# Patient Record
Sex: FEMALE | NOT HISPANIC OR LATINO | URBAN - METROPOLITAN AREA
[De-identification: names, ages, dates, MRNs, and addresses within clinical notes are randomized per-mention and may not be internally consistent; named-entity substitution may affect disease eponyms.]

---

## 2024-01-16 ENCOUNTER — HOSPITAL ENCOUNTER (EMERGENCY)
Facility: HOSPITAL | Age: 39
Discharge: HOME | End: 2024-01-16
Attending: EMERGENCY MEDICINE
Payer: MEDICAID

## 2024-01-16 VITALS
DIASTOLIC BLOOD PRESSURE: 95 MMHG | TEMPERATURE: 97.7 F | HEART RATE: 102 BPM | HEIGHT: 65 IN | RESPIRATION RATE: 20 BRPM | OXYGEN SATURATION: 100 % | SYSTOLIC BLOOD PRESSURE: 140 MMHG | WEIGHT: 162 LBS | BODY MASS INDEX: 26.99 KG/M2

## 2024-01-16 PROCEDURE — 99281 EMR DPT VST MAYX REQ PHY/QHP: CPT | Performed by: EMERGENCY MEDICINE

## 2024-01-16 PROCEDURE — 4500999001 HC ED NO CHARGE

## 2024-01-21 ENCOUNTER — HOSPITAL ENCOUNTER (EMERGENCY)
Facility: HOSPITAL | Age: 39
Discharge: HOME | End: 2024-01-21
Attending: EMERGENCY MEDICINE
Payer: MEDICAID

## 2024-01-21 VITALS
HEIGHT: 65 IN | OXYGEN SATURATION: 98 % | SYSTOLIC BLOOD PRESSURE: 145 MMHG | TEMPERATURE: 98.1 F | BODY MASS INDEX: 27.03 KG/M2 | WEIGHT: 162.26 LBS | DIASTOLIC BLOOD PRESSURE: 98 MMHG | RESPIRATION RATE: 18 BRPM | HEART RATE: 98 BPM

## 2024-01-21 DIAGNOSIS — R21 RASH AND OTHER NONSPECIFIC SKIN ERUPTION: Primary | ICD-10-CM

## 2024-01-21 PROCEDURE — 2500000001 HC RX 250 WO HCPCS SELF ADMINISTERED DRUGS (ALT 637 FOR MEDICARE OP): Performed by: EMERGENCY MEDICINE

## 2024-01-21 PROCEDURE — 99283 EMERGENCY DEPT VISIT LOW MDM: CPT | Performed by: EMERGENCY MEDICINE

## 2024-01-21 PROCEDURE — 2500000004 HC RX 250 GENERAL PHARMACY W/ HCPCS (ALT 636 FOR OP/ED): Performed by: EMERGENCY MEDICINE

## 2024-01-21 RX ORDER — LORATADINE 10 MG/1
10 TABLET ORAL DAILY
Status: DISCONTINUED | OUTPATIENT
Start: 2024-01-21 | End: 2024-01-21 | Stop reason: HOSPADM

## 2024-01-21 RX ORDER — FAMOTIDINE 20 MG/1
20 TABLET, FILM COATED ORAL ONCE
Status: COMPLETED | OUTPATIENT
Start: 2024-01-21 | End: 2024-01-21

## 2024-01-21 RX ORDER — PREDNISONE 10 MG/1
40 TABLET ORAL DAILY
Qty: 16 TABLET | Refills: 0 | Status: SHIPPED | OUTPATIENT
Start: 2024-01-21 | End: 2024-01-25

## 2024-01-21 RX ADMIN — FAMOTIDINE 20 MG: 20 TABLET, FILM COATED ORAL at 15:43

## 2024-01-21 RX ADMIN — LORATADINE 10 MG: 10 TABLET ORAL at 15:43

## 2024-01-21 RX ADMIN — DEXAMETHASONE 10 MG: 6 TABLET ORAL at 15:43

## 2024-01-21 ASSESSMENT — PAIN SCALES - GENERAL: PAINLEVEL_OUTOF10: 10 - WORST POSSIBLE PAIN

## 2024-01-21 ASSESSMENT — COLUMBIA-SUICIDE SEVERITY RATING SCALE - C-SSRS
6. HAVE YOU EVER DONE ANYTHING, STARTED TO DO ANYTHING, OR PREPARED TO DO ANYTHING TO END YOUR LIFE?: NO
2. HAVE YOU ACTUALLY HAD ANY THOUGHTS OF KILLING YOURSELF?: NO
1. IN THE PAST MONTH, HAVE YOU WISHED YOU WERE DEAD OR WISHED YOU COULD GO TO SLEEP AND NOT WAKE UP?: NO

## 2024-01-21 ASSESSMENT — PAIN - FUNCTIONAL ASSESSMENT: PAIN_FUNCTIONAL_ASSESSMENT: 0-10

## 2024-01-21 NOTE — DISCHARGE INSTRUCTIONS
As discussed, call dermatology tomorrow regarding results for you to take the steroids for the next few days and you can use over-the-counter Pepcid and Benadryl as needed

## 2024-01-21 NOTE — ED PROVIDER NOTES
EMERGENCY DEPARTMENT ENCOUNTER      [ ] CODE STEMI [ ] CODE Neuro [ ] CODE Yellow [ ] Modified Trauma [ ] CODE Blue      CHIEF COMPLAINT      Chief Complaint   Patient presents with    Rash     Pt complains of a persistent rash on all over her body x1 year.  Has received treatment multiple times.  Was told it is mrsa.  States it is not getting better and her skin burns.        Mode of Arrival:  Primary Care Provider: No primary care provider on file.  Medical Record Number: [id number]      History obtained by: Patient  Limited by nothing  Time seen: 4:39 PM    QUALITY MEASURES   PPE Utilized: N95 with goggles and gloves      HPI      Latrice Sofia is a 38 y.o. female with a history of hep C, untreated as well as a skin rash that has been occurring for the last year had already seen dermatology about a week ago with biopsy performed and had been given antibiotics in the past including Keflex Bactrim and doxycycline, states that she still having same rash along with itchiness of right shoulder.  When asked when was the last time she took doxycycline she was not sure.  Denies that the rash that has changed primarily that it is still causing itchiness and would like medication for relief.  Does not know the results of the biopsy yet.  Denies any associated fever or chills or mouth sores or inability to eat, trouble breathing chest pain abdominal pain dysuria or any desqumenation of skin.  No new complaints      Patient otherwise denies fever, chills, n/v/d, chest pain, shortness of breath, sore throat, cough, rhinorrhea, abdominal pain, dysuria, hematuria, swollen extremities , hematemesis, hematochezia, melena or any other accompanying symptoms of late.      The patient has no other complaints at this time.               PAST MEDICAL HISTORY    No past medical history on file.      I have personally reviewed the patient's past medical history in the records.  Ernie Slade MD    SURGICAL HISTORY    No past surgical history  "on file.    I have personally reviewed the patient's past surgical history in the records.  Ernie Slade MD    CURRENT MEDICATIONS    I have reviewed the patient’s medications.   Please see nursing and pharmacy records for the most up to date list.     [unfilled]    ALLERGIES    Allergies   Allergen Reactions    Pineapple Shortness of breath       I have personally reviewed the patient's past history of allergies in the records.  Ernie Slade MD    FAMILY HISTORY    No family history on file.    I have personally reviewed the patient's family history in the records.  Ernie Slade MD    SOCIAL HISTORY    Social History     Socioeconomic History    Marital status: Unknown     Spouse name: Not on file    Number of children: Not on file    Years of education: Not on file    Highest education level: Not on file   Occupational History    Not on file   Tobacco Use    Smoking status: Not on file    Smokeless tobacco: Not on file   Substance and Sexual Activity    Alcohol use: Not on file    Drug use: Not on file    Sexual activity: Not on file   Other Topics Concern    Not on file   Social History Narrative    Not on file     Social Determinants of Health     Financial Resource Strain: Not on file   Food Insecurity: Not on file   Transportation Needs: Not on file   Physical Activity: Not on file   Stress: Not on file   Social Connections: Not on file   Intimate Partner Violence: Not on file   Housing Stability: Not on file         I have personally reviewed the patient's social history in the records.  Ernie Slade MD    REVIEW OF SYSTEMS      14 point ROS was reviewed and negative except as noted above in HPI.      PHYSICAL EXAM    VITAL SIGNS:    BP (!) 145/98   Pulse 98   Temp 36.7 °C (98.1 °F)   Resp 18   Ht 1.651 m (5' 5\")   Wt 73.6 kg (162 lb 4.1 oz)   SpO2 98%   BMI 27.00 kg/m²    Review EMR for vital signs  Nursing note and vitals reviewed.    Constitutional:  Alert and oriented, well-developed, well-nourished, " appears stated age, non-toxic appearing  HENT:  Normocephalic, atraumatic, bilateral external ears normal, oropharynx moist, Nose normal.  Neck: normal range of motion, no tenderness, supple, no stridor.  Eyes:  PERRL, EOMI, conjunctiva normal, no discharge.   Cardiovascular:  Normal heart rate, normal rhythm, no murmurs, no rubs, no gallops.   Respiratory:  Normal breath sounds, no respiratory distress, no wheezing, no chest wall tenderness.      Integument:   Slight hives noted in the right shoulder but otherwise noted generalized chronic sores throughout the body including face back trunk arms and legs.  None appear to be infected  Back:  No midline tenderness, no CVA tenderness.   Musculoskeletal:  Intact distal pulses, no tenderness, no cyanosis, no clubbing, with capillary refill less than 2 seconds. Good range of motion in all major joints. No tenderness to palpation or major deformities noted.    Neurologic:  Alert & oriented x 3, normal motor function, normal sensory function, no focal deficits noted. Cranial nerves II-XII intact.       EKG  None          Reviewed and interpreted by me, Ernie Slade MD             RADIOLOGY  No orders to display       All Imaging studies evaluated and interpreted by ED physician except when noted otherwise.    ED PROVIDER INTERPRETATION (XRAYS ONLY):       *I have interpreted the x-ray real-time in the ED myself, and made a clinical decision on it prior to the formal radiology reading.    Ernie Slade M.D.    RADIOLOGIST IMPRESSION (U/S, CT, MRI):   No orders to display         PERTINENT LABS    Please refer to the chart for all lab work and to MDM for relevant discussion.      PROCEDURE    None (procdoc)    ED COURSE & MEDICAL DECISION MAKING    Pertinent Labs & Imaging studies reviewed. (See chart for details)    MDM:    Assessment: Latrice Sofia is a 38 y.o.female who presents to the ED with nonspecific rash that appears to be chronic and told patient I would give her  "prednisone for the next few days but that ultimately she should call her dermatologist to find out the results of the biopsy and further recommendations for treatment for this chronic condition.  Patient understands and agrees with plan.  Will give Decadron Pepcid and Benadryl prior to discharge as significant other drove her here.  Do not suspect Cooper-Helder syndrome or any other acute illness      Prior records in EPIC reviewed by me.     2023 Coding Requirements:  --Independent historian(s):    see HPI  --Review of prior records:    EHR reviewed   --Relevant comorbidities:    see records  --Social determinants of health:            CELLULITIS/ ABSCESS / RASH/ INSECT BITE  I have considered the following   with regards to this patient's condition: Skin rash, allergic reaction, contact   dermatitis, poison ivy, drug rash, erythema multiforme, hives, urticaria, soft   tissue infection, abscess, cellulitis, necrotizing fasciitis, systemic infection, SIRS,   Sepsis, impetigo, MRSA, insect bite, scabies or bedbugs, systemic infection,   chickenpox, herpes zoster, disseminated gonococcemia, Lyme disease,   meningococcemia, pityriasis rosea, Melitno mountain spotted fever, scarlet fever,   syphilis, toxic shock syndrome, viral exanthem.              ED VITALS  Vitals:    01/21/24 1524 01/21/24 1548   BP: (!) 147/101 (!) 145/98   Pulse: (!) 130 98   Resp: 18 18   Temp: 36.7 °C (98.1 °F)    SpO2: 97% 98%   Weight: 73.6 kg (162 lb 4.1 oz)    Height: 1.651 m (5' 5\")        BP  Min: 145/98  Max: 147/101    As part of the 2022 [facility] reporting requirements, the following measures have been reviewed and documented:    None     1. Rash and other nonspecific skin eruption        Diagnoses as of 01/21/24 1639   Rash and other nonspecific skin eruption         DISPOSITION       DISCHARGE.  The patient is discharged back to their place of residence.  Discharge diagnosis, instructions and plan were discussed and understood. At the " time of discharge the patient was comfortable and was in no apparent distress. Patient is aware of diagnostic uncertainty and was notified though testing is negative here, there is a very small chance that pathology may be missed.  The patient understands these risks and the patient /family understood to return immediately to the emergency department if the symptoms worsen or if they have any additional concerns.    DISCHARGE MEDICATIONS  New Prescriptions    No medications on file       FOLLOW UP  No follow-up provider specified.    Ernie Slade    This note was created with the assistance of voice recognition technology.  While attempts were made to ensure accuracy, mis-transcription may be present due to limitations in the software.        Electronically signed by MD Ernie Ward MD  01/21/24 0122

## 2024-01-27 ENCOUNTER — HOSPITAL ENCOUNTER (EMERGENCY)
Facility: HOSPITAL | Age: 39
Discharge: HOME | End: 2024-01-28
Payer: MEDICAID

## 2024-01-27 VITALS
HEIGHT: 65 IN | BODY MASS INDEX: 25.71 KG/M2 | HEART RATE: 106 BPM | RESPIRATION RATE: 15 BRPM | SYSTOLIC BLOOD PRESSURE: 118 MMHG | OXYGEN SATURATION: 100 % | DIASTOLIC BLOOD PRESSURE: 75 MMHG | WEIGHT: 154.32 LBS | TEMPERATURE: 97.9 F

## 2024-01-27 PROCEDURE — 99281 EMR DPT VST MAYX REQ PHY/QHP: CPT

## 2024-01-27 PROCEDURE — 4500999001 HC ED NO CHARGE

## 2024-01-27 ASSESSMENT — PAIN SCALES - GENERAL: PAINLEVEL_OUTOF10: 8

## 2024-01-27 ASSESSMENT — PAIN DESCRIPTION - PAIN TYPE: TYPE: ACUTE PAIN

## 2024-01-27 ASSESSMENT — PAIN - FUNCTIONAL ASSESSMENT: PAIN_FUNCTIONAL_ASSESSMENT: 0-10

## 2024-01-27 ASSESSMENT — COLUMBIA-SUICIDE SEVERITY RATING SCALE - C-SSRS
1. IN THE PAST MONTH, HAVE YOU WISHED YOU WERE DEAD OR WISHED YOU COULD GO TO SLEEP AND NOT WAKE UP?: NO
6. HAVE YOU EVER DONE ANYTHING, STARTED TO DO ANYTHING, OR PREPARED TO DO ANYTHING TO END YOUR LIFE?: NO
2. HAVE YOU ACTUALLY HAD ANY THOUGHTS OF KILLING YOURSELF?: NO

## 2024-01-27 ASSESSMENT — PAIN DESCRIPTION - LOCATION: LOCATION: HEAD

## 2024-01-27 ASSESSMENT — PAIN DESCRIPTION - PROGRESSION: CLINICAL_PROGRESSION: NOT CHANGED

## 2024-05-31 ENCOUNTER — APPOINTMENT (OUTPATIENT)
Dept: CARDIOLOGY | Facility: HOSPITAL | Age: 39
End: 2024-05-31
Payer: MEDICAID

## 2024-05-31 ENCOUNTER — HOSPITAL ENCOUNTER (EMERGENCY)
Facility: HOSPITAL | Age: 39
Discharge: OTHER INSTITUTION | End: 2024-06-01
Attending: STUDENT IN AN ORGANIZED HEALTH CARE EDUCATION/TRAINING PROGRAM
Payer: MEDICAID

## 2024-05-31 DIAGNOSIS — T50.902A INTENTIONAL DRUG OVERDOSE, INITIAL ENCOUNTER (MULTI): ICD-10-CM

## 2024-05-31 DIAGNOSIS — T14.91XA SUICIDE ATTEMPT (MULTI): Primary | ICD-10-CM

## 2024-05-31 LAB
ALBUMIN SERPL-MCNC: 3.9 G/DL (ref 3.5–5)
ALP BLD-CCNC: 73 U/L (ref 35–125)
ALT SERPL-CCNC: 11 U/L (ref 5–40)
AMPHETAMINES UR QL SCN>1000 NG/ML: POSITIVE
ANION GAP SERPL CALC-SCNC: 10 MMOL/L
APAP SERPL-MCNC: <5 UG/ML
APPEARANCE UR: ABNORMAL
AST SERPL-CCNC: 17 U/L (ref 5–40)
BACTERIA #/AREA URNS AUTO: ABNORMAL /HPF
BARBITURATES UR QL SCN>300 NG/ML: NEGATIVE
BASOPHILS # BLD AUTO: 0.06 X10*3/UL (ref 0–0.1)
BASOPHILS NFR BLD AUTO: 1 %
BENZODIAZ UR QL SCN>300 NG/ML: NEGATIVE
BILIRUB SERPL-MCNC: 0.3 MG/DL (ref 0.1–1.2)
BILIRUB UR STRIP.AUTO-MCNC: NEGATIVE MG/DL
BUN SERPL-MCNC: 7 MG/DL (ref 8–25)
BZE UR QL SCN>300 NG/ML: NEGATIVE
CALCIUM SERPL-MCNC: 8.4 MG/DL (ref 8.5–10.4)
CANNABINOIDS UR QL SCN>50 NG/ML: NEGATIVE
CHLORIDE SERPL-SCNC: 102 MMOL/L (ref 97–107)
CK SERPL-CCNC: 101 U/L (ref 24–195)
CO2 SERPL-SCNC: 23 MMOL/L (ref 24–31)
COLOR UR: ABNORMAL
CREAT SERPL-MCNC: 0.7 MG/DL (ref 0.4–1.6)
EGFRCR SERPLBLD CKD-EPI 2021: >90 ML/MIN/1.73M*2
EOSINOPHIL # BLD AUTO: 0.23 X10*3/UL (ref 0–0.7)
EOSINOPHIL NFR BLD AUTO: 3.9 %
ERYTHROCYTE [DISTWIDTH] IN BLOOD BY AUTOMATED COUNT: 11.6 % (ref 11.5–14.5)
ETHANOL SERPL-MCNC: 0.08 G/DL
FENTANYL+NORFENTANYL UR QL SCN: NEGATIVE
GLUCOSE SERPL-MCNC: 171 MG/DL (ref 65–99)
GLUCOSE UR STRIP.AUTO-MCNC: NORMAL MG/DL
HCG UR QL IA.RAPID: NEGATIVE
HCT VFR BLD AUTO: 36.3 % (ref 36–46)
HGB BLD-MCNC: 12 G/DL (ref 12–16)
HYALINE CASTS #/AREA URNS AUTO: ABNORMAL /LPF
IMM GRANULOCYTES # BLD AUTO: 0.02 X10*3/UL (ref 0–0.7)
IMM GRANULOCYTES NFR BLD AUTO: 0.3 % (ref 0–0.9)
KETONES UR STRIP.AUTO-MCNC: NEGATIVE MG/DL
LEUKOCYTE ESTERASE UR QL STRIP.AUTO: ABNORMAL
LYMPHOCYTES # BLD AUTO: 2.02 X10*3/UL (ref 1.2–4.8)
LYMPHOCYTES NFR BLD AUTO: 34.2 %
MAGNESIUM SERPL-MCNC: 1.9 MG/DL (ref 1.6–3.1)
MCH RBC QN AUTO: 30.4 PG (ref 26–34)
MCHC RBC AUTO-ENTMCNC: 33.1 G/DL (ref 32–36)
MCV RBC AUTO: 92 FL (ref 80–100)
METHADONE UR QL SCN>300 NG/ML: NEGATIVE
MONOCYTES # BLD AUTO: 0.42 X10*3/UL (ref 0.1–1)
MONOCYTES NFR BLD AUTO: 7.1 %
MUCOUS THREADS #/AREA URNS AUTO: ABNORMAL /LPF
NEUTROPHILS # BLD AUTO: 3.15 X10*3/UL (ref 1.2–7.7)
NEUTROPHILS NFR BLD AUTO: 53.5 %
NITRITE UR QL STRIP.AUTO: NEGATIVE
NRBC BLD-RTO: 0 /100 WBCS (ref 0–0)
OPIATES UR QL SCN>300 NG/ML: NEGATIVE
OXYCODONE UR QL: NEGATIVE
PCP UR QL SCN>25 NG/ML: NEGATIVE
PH UR STRIP.AUTO: 6 [PH]
PLATELET # BLD AUTO: 216 X10*3/UL (ref 150–450)
POTASSIUM SERPL-SCNC: 3.2 MMOL/L (ref 3.4–5.1)
PROT SERPL-MCNC: 6.6 G/DL (ref 5.9–7.9)
PROT UR STRIP.AUTO-MCNC: NEGATIVE MG/DL
RBC # BLD AUTO: 3.95 X10*6/UL (ref 4–5.2)
RBC # UR STRIP.AUTO: ABNORMAL /UL
RBC #/AREA URNS AUTO: ABNORMAL /HPF
SALICYLATES SERPL-MCNC: 1 MG/DL
SODIUM SERPL-SCNC: 135 MMOL/L (ref 133–145)
SP GR UR STRIP.AUTO: 1.01
SQUAMOUS #/AREA URNS AUTO: ABNORMAL /HPF
UROBILINOGEN UR STRIP.AUTO-MCNC: NORMAL MG/DL
WBC # BLD AUTO: 5.9 X10*3/UL (ref 4.4–11.3)
WBC #/AREA URNS AUTO: ABNORMAL /HPF

## 2024-05-31 PROCEDURE — 80320 DRUG SCREEN QUANTALCOHOLS: CPT | Performed by: STUDENT IN AN ORGANIZED HEALTH CARE EDUCATION/TRAINING PROGRAM

## 2024-05-31 PROCEDURE — 82550 ASSAY OF CK (CPK): CPT | Performed by: STUDENT IN AN ORGANIZED HEALTH CARE EDUCATION/TRAINING PROGRAM

## 2024-05-31 PROCEDURE — 96366 THER/PROPH/DIAG IV INF ADDON: CPT

## 2024-05-31 PROCEDURE — 81025 URINE PREGNANCY TEST: CPT | Performed by: STUDENT IN AN ORGANIZED HEALTH CARE EDUCATION/TRAINING PROGRAM

## 2024-05-31 PROCEDURE — 2500000004 HC RX 250 GENERAL PHARMACY W/ HCPCS (ALT 636 FOR OP/ED): Performed by: STUDENT IN AN ORGANIZED HEALTH CARE EDUCATION/TRAINING PROGRAM

## 2024-05-31 PROCEDURE — 99285 EMERGENCY DEPT VISIT HI MDM: CPT | Mod: 25 | Performed by: STUDENT IN AN ORGANIZED HEALTH CARE EDUCATION/TRAINING PROGRAM

## 2024-05-31 PROCEDURE — 96361 HYDRATE IV INFUSION ADD-ON: CPT

## 2024-05-31 PROCEDURE — 96366 THER/PROPH/DIAG IV INF ADDON: CPT | Performed by: STUDENT IN AN ORGANIZED HEALTH CARE EDUCATION/TRAINING PROGRAM

## 2024-05-31 PROCEDURE — P9612 CATHETERIZE FOR URINE SPEC: HCPCS

## 2024-05-31 PROCEDURE — 96374 THER/PROPH/DIAG INJ IV PUSH: CPT | Mod: 59

## 2024-05-31 PROCEDURE — 96361 HYDRATE IV INFUSION ADD-ON: CPT | Performed by: STUDENT IN AN ORGANIZED HEALTH CARE EDUCATION/TRAINING PROGRAM

## 2024-05-31 PROCEDURE — 96375 TX/PRO/DX INJ NEW DRUG ADDON: CPT | Performed by: STUDENT IN AN ORGANIZED HEALTH CARE EDUCATION/TRAINING PROGRAM

## 2024-05-31 PROCEDURE — 36415 COLL VENOUS BLD VENIPUNCTURE: CPT | Performed by: STUDENT IN AN ORGANIZED HEALTH CARE EDUCATION/TRAINING PROGRAM

## 2024-05-31 PROCEDURE — 87086 URINE CULTURE/COLONY COUNT: CPT | Mod: TRILAB,WESLAB | Performed by: STUDENT IN AN ORGANIZED HEALTH CARE EDUCATION/TRAINING PROGRAM

## 2024-05-31 PROCEDURE — 96375 TX/PRO/DX INJ NEW DRUG ADDON: CPT

## 2024-05-31 PROCEDURE — 85025 COMPLETE CBC W/AUTO DIFF WBC: CPT | Performed by: STUDENT IN AN ORGANIZED HEALTH CARE EDUCATION/TRAINING PROGRAM

## 2024-05-31 PROCEDURE — 93005 ELECTROCARDIOGRAM TRACING: CPT

## 2024-05-31 PROCEDURE — 83735 ASSAY OF MAGNESIUM: CPT | Performed by: STUDENT IN AN ORGANIZED HEALTH CARE EDUCATION/TRAINING PROGRAM

## 2024-05-31 PROCEDURE — 96374 THER/PROPH/DIAG INJ IV PUSH: CPT | Mod: 59 | Performed by: STUDENT IN AN ORGANIZED HEALTH CARE EDUCATION/TRAINING PROGRAM

## 2024-05-31 PROCEDURE — 82435 ASSAY OF BLOOD CHLORIDE: CPT | Performed by: STUDENT IN AN ORGANIZED HEALTH CARE EDUCATION/TRAINING PROGRAM

## 2024-05-31 PROCEDURE — 80307 DRUG TEST PRSMV CHEM ANLYZR: CPT | Performed by: STUDENT IN AN ORGANIZED HEALTH CARE EDUCATION/TRAINING PROGRAM

## 2024-05-31 PROCEDURE — 81001 URINALYSIS AUTO W/SCOPE: CPT | Mod: XU | Performed by: STUDENT IN AN ORGANIZED HEALTH CARE EDUCATION/TRAINING PROGRAM

## 2024-05-31 PROCEDURE — 96365 THER/PROPH/DIAG IV INF INIT: CPT

## 2024-05-31 PROCEDURE — 96365 THER/PROPH/DIAG IV INF INIT: CPT | Performed by: STUDENT IN AN ORGANIZED HEALTH CARE EDUCATION/TRAINING PROGRAM

## 2024-05-31 PROCEDURE — 99285 EMERGENCY DEPT VISIT HI MDM: CPT | Mod: 25

## 2024-05-31 RX ORDER — LORAZEPAM 2 MG/ML
0.5 INJECTION INTRAMUSCULAR ONCE
Status: COMPLETED | OUTPATIENT
Start: 2024-05-31 | End: 2024-05-31

## 2024-05-31 RX ORDER — POTASSIUM CHLORIDE 14.9 MG/ML
20 INJECTION INTRAVENOUS ONCE
Status: COMPLETED | OUTPATIENT
Start: 2024-05-31 | End: 2024-05-31

## 2024-05-31 RX ORDER — NALOXONE HYDROCHLORIDE 1 MG/ML
2 INJECTION INTRAMUSCULAR; INTRAVENOUS; SUBCUTANEOUS ONCE
Status: COMPLETED | OUTPATIENT
Start: 2024-05-31 | End: 2024-05-31

## 2024-05-31 RX ORDER — LORAZEPAM 2 MG/ML
1 INJECTION INTRAMUSCULAR ONCE
Status: COMPLETED | OUTPATIENT
Start: 2024-05-31 | End: 2024-05-31

## 2024-05-31 RX ADMIN — NALOXONE HYDROCHLORIDE 2 MG: 1 INJECTION PARENTERAL at 15:54

## 2024-05-31 RX ADMIN — LORAZEPAM 1 MG: 2 INJECTION, SOLUTION INTRAMUSCULAR; INTRAVENOUS at 20:50

## 2024-05-31 RX ADMIN — SODIUM CHLORIDE 1000 ML: 900 INJECTION, SOLUTION INTRAVENOUS at 18:03

## 2024-05-31 RX ADMIN — POTASSIUM CHLORIDE 20 MEQ: 14.9 INJECTION, SOLUTION INTRAVENOUS at 18:11

## 2024-05-31 RX ADMIN — LORAZEPAM 0.5 MG: 2 INJECTION, SOLUTION INTRAMUSCULAR; INTRAVENOUS at 18:04

## 2024-05-31 RX ADMIN — SODIUM CHLORIDE 1000 ML: 900 INJECTION, SOLUTION INTRAVENOUS at 15:52

## 2024-05-31 ASSESSMENT — PAIN - FUNCTIONAL ASSESSMENT: PAIN_FUNCTIONAL_ASSESSMENT: 0-10

## 2024-05-31 ASSESSMENT — PAIN SCALES - GENERAL: PAINLEVEL_OUTOF10: 0 - NO PAIN

## 2024-05-31 NOTE — ED PROVIDER NOTES
HPI   Chief Complaint   Patient presents with    Suicide Attempt     Patient bib Ems from home for a suicide attempt. Might have taken benadryl and doxycycline, known drug abuse hx. Hx of Hep X       Patient is a 38-year-old female brought in by EMS for evaluation of a suicide attempt.  Patient has been getting into arguments with her  over the last several weeks.  He states that today while he was out she texted him telling him to think the doctor for the medication and stated that she would be long gone by the time he returned.  Patient was found by  to be walking along the side of the road and he called police as he was concerned.  Police came and evaluated patient initially she was awake, alert and talking however started becoming more lethargic and nearly fell.  Patient was placed on involuntary hold and brought in by EMS.  Patient did reportedly state that she took an prescription medication of Benadryl and admitted to police that it was a suicide attempt.  EMS did note that patient had constricted pupils and attempted to give Narcan however this had no improvement on patient's mental status prior to arrival.      History provided by:  EMS personnel, spouse and police                      [place] Coma Scale Score: 8                     Patient History   No past medical history on file.  No past surgical history on file.  No family history on file.  Social History     Tobacco Use    Smoking status: Not on file    Smokeless tobacco: Not on file   Substance Use Topics    Alcohol use: Not on file    Drug use: Not on file       Physical Exam   ED Triage Vitals [05/31/24 1544]   Temp Heart Rate Respirations BP   -- (!) 133 (!) 23 94/55      Pulse Ox Temp src Heart Rate Source Patient Position   94 % -- -- --      BP Location FiO2 (%)     -- --       Physical Exam  Vitals and nursing note reviewed.   Constitutional:       General: She is not in acute distress.     Appearance: She is ill-appearing.    HENT:      Head: Normocephalic and atraumatic.      Mouth/Throat:      Mouth: Mucous membranes are moist.   Eyes:      Pupils: Pupils are equal, round, and reactive to light.      Comments: Pupils are constricted but reactive bilaterally   Cardiovascular:      Rate and Rhythm: Regular rhythm. Tachycardia present.   Pulmonary:      Effort: Pulmonary effort is normal. No respiratory distress.      Breath sounds: No stridor. No wheezing or rhonchi.   Abdominal:      General: Abdomen is flat.      Tenderness: There is no abdominal tenderness. There is no guarding or rebound.   Musculoskeletal:      Cervical back: Normal range of motion.   Skin:     General: Skin is warm and dry.   Neurological:      Mental Status: She is disoriented.      Comments: Patient very disoriented, lethargic however moving all extremities equally with no obvious focal motor deficit       Recent Results (from the past 24 hour(s))   CBC and Auto Differential    Collection Time: 05/31/24  3:52 PM   Result Value Ref Range    WBC 5.9 4.4 - 11.3 x10*3/uL    nRBC 0.0 0.0 - 0.0 /100 WBCs    RBC 3.95 (L) 4.00 - 5.20 x10*6/uL    Hemoglobin 12.0 12.0 - 16.0 g/dL    Hematocrit 36.3 36.0 - 46.0 %    MCV 92 80 - 100 fL    MCH 30.4 26.0 - 34.0 pg    MCHC 33.1 32.0 - 36.0 g/dL    RDW 11.6 11.5 - 14.5 %    Platelets 216 150 - 450 x10*3/uL    Neutrophils % 53.5 40.0 - 80.0 %    Immature Granulocytes %, Automated 0.3 0.0 - 0.9 %    Lymphocytes % 34.2 13.0 - 44.0 %    Monocytes % 7.1 2.0 - 10.0 %    Eosinophils % 3.9 0.0 - 6.0 %    Basophils % 1.0 0.0 - 2.0 %    Neutrophils Absolute 3.15 1.20 - 7.70 x10*3/uL    Immature Granulocytes Absolute, Automated 0.02 0.00 - 0.70 x10*3/uL    Lymphocytes Absolute 2.02 1.20 - 4.80 x10*3/uL    Monocytes Absolute 0.42 0.10 - 1.00 x10*3/uL    Eosinophils Absolute 0.23 0.00 - 0.70 x10*3/uL    Basophils Absolute 0.06 0.00 - 0.10 x10*3/uL   Comprehensive Metabolic Panel    Collection Time: 05/31/24  3:52 PM   Result Value Ref  Range    Glucose 171 (H) 65 - 99 mg/dL    Sodium 135 133 - 145 mmol/L    Potassium 3.2 (L) 3.4 - 5.1 mmol/L    Chloride 102 97 - 107 mmol/L    Bicarbonate 23 (L) 24 - 31 mmol/L    Urea Nitrogen 7 (L) 8 - 25 mg/dL    Creatinine 0.70 0.40 - 1.60 mg/dL    eGFR >90 >60 mL/min/1.73m*2    Calcium 8.4 (L) 8.5 - 10.4 mg/dL    Albumin 3.9 3.5 - 5.0 g/dL    Alkaline Phosphatase 73 35 - 125 U/L    Total Protein 6.6 5.9 - 7.9 g/dL    AST 17 5 - 40 U/L    Bilirubin, Total 0.3 0.1 - 1.2 mg/dL    ALT 11 5 - 40 U/L    Anion Gap 10 <=19 mmol/L   Acute Toxicology Panel, Blood    Collection Time: 05/31/24  3:52 PM   Result Value Ref Range    Acetaminophen <5.0 15.0 - 30.0 ug/mL    Salicylate  1 3 - 25 mg/dL    Alcohol 0.081 (H) 0.000 - 0.010 g/dL   Magnesium    Collection Time: 05/31/24  3:52 PM   Result Value Ref Range    Magnesium 1.90 1.60 - 3.10 mg/dL   Drug Screen, Urine    Collection Time: 05/31/24  4:13 PM   Result Value Ref Range    Amphetamine Screen, Urine Positive (A)      Barbiturate Screen, Urine Negative      Benzodiazepines Screen, Urine Negative      Cannabinoid Screen, Urine Negative      Cocaine Metabolite Screen, Urine Negative      Fentanyl Screen, Urine Negative       Methadone Screen, Urine Negative      Opiate Screen, Urine Negative      Oxycodone Screen, Urine Negative      PCP Screen, Urine Negative     hCG, Urine, Qualitative    Collection Time: 05/31/24  4:13 PM   Result Value Ref Range    HCG, Urine NEGATIVE NEGATIVE   Urinalysis with Reflex Culture and Microscopic    Collection Time: 05/31/24  4:13 PM   Result Value Ref Range    Color, Urine Light-Yellow Light-Yellow, Yellow, Dark-Yellow    Appearance, Urine Turbid (N) Clear    Specific Gravity, Urine 1.008 1.005 - 1.035    pH, Urine 6.0 5.0, 5.5, 6.0, 6.5, 7.0, 7.5, 8.0    Protein, Urine NEGATIVE NEGATIVE, 10 (TRACE), 20 (TRACE) mg/dL    Glucose, Urine Normal Normal mg/dL    Blood, Urine 0.03 (TRACE) (A) NEGATIVE    Ketones, Urine NEGATIVE NEGATIVE mg/dL     Bilirubin, Urine NEGATIVE NEGATIVE    Urobilinogen, Urine Normal Normal mg/dL    Nitrite, Urine NEGATIVE NEGATIVE    Leukocyte Esterase, Urine 25 Joselyn/µL (A) NEGATIVE   Microscopic Only, Urine    Collection Time: 05/31/24  4:13 PM   Result Value Ref Range    WBC, Urine 1-5 1-5, NONE /HPF    RBC, Urine 1-2 NONE, 1-2, 3-5 /HPF    Squamous Epithelial Cells, Urine 10-25 (FEW) Reference range not established. /HPF    Bacteria, Urine 1+ (A) NONE SEEN /HPF    Mucus, Urine FEW Reference range not established. /LPF    Hyaline Casts, Urine OCCASIONAL (A) NONE /LPF         ED Course & MDM   ED Course as of 05/31/24 1856   Fri May 31, 2024   1550 EKG Time:1550  EKG Interpretation time:1550  EKG Interpretation: EKG shows sinus tachycardia, incomplete right bundle branch block, normal axis, QTc 489    EKG was interpreted by myself independently [JL]   1558 EKG Time:1558  EKG Interpretation time:1558  EKG Interpretation: EKG shows sinus tachycardia with a rate of 114 bpm, normal axis, incomplete right bundle branch block, no evidence of STEMI, QTc slightly prolonged at 493    EKG was interpreted by myself independently [JL]   1841 EKG Time:1841  EKG Interpretation time:1841  EKG Interpretation: EKG shows sinus tachycardia with a rate of 126 bpm, normal axis, QTc prolonged at 427, QRS normal at 106    EKG was interpreted by myself independently [JL]      ED Course User Index  [JL] Marcell Cornell,        Medical Decision Making  Patient is a 38-year-old female who presents emergency department for evaluation of intentional overdose.  Patient very disoriented and lethargic on initial presentation.  She is tachycardic and EKG shows sinus tachycardia with an incomplete right bundle branch block and only mildly elevated QTc of 489.  QRS complexes narrow at 112.  In reviewing patient's chart she was recently prescribed doxepin which is likely the medication that she took.  We were able to talk with  and she will brought the  bottles that were found empty which were for doxepin.  Is unclear exactly how many she may have taken as the prescription was filled and April.  In talking with poison control patient to be observed for any seizures, hypotension, widening of the QRS or QTc.  Patient was borderline hypotensive with a blood pressure in the upper 80s and low 90s however always had a MAP above 65.  She was given several liters of IV normal saline with improvement of her blood pressure up to 106 systolic.  Repeat EKGs do show persistent tachycardia however QRS is remaining narrow at 106 and QTc is slightly prolonging to 527.  Blood work was remarkable for slight hypokalemia with a potassium of 3.2 and 20 mill equivalents of potassium was ordered for repletion IV.  Magnesium was normal at 1.9.  She does have normal kidney function, normal white count.  Urinalysis shows no evidence of urinary tract infection.  Patient was observed for several hours for any improvement of her mentation.  If patient persistently remains altered she will be admitted for continued monitoring until medically cleared.  Once patient is medically cleared she will be evaluated by psychiatry for likely inpatient placement.  Case was signed out to oncoming physician pending continued monitoring and evaluation of the patient.        Procedure  Procedures     Marcell Cornell, DO  05/31/24 3867

## 2024-05-31 NOTE — PROGRESS NOTES
Patient received in sign out from Dr Cornell. Patient with suicide attempt by taking her doxepin.    Through the night, patient had gradual improvement in mentation.  Vital signs normalized.  Patient now able to ambulate and converse appropriately.  Patient tolerating oral intake.  Patient is medically cleared and awaiting placement at this time.    Patient signed out to Dr. Resendez pending placement.

## 2024-05-31 NOTE — PROGRESS NOTES
"Social Work Note  05/31/2024  18:55 SW sent secure chat message to Attendings inquiring if patient is medically cleared. Per Doctor, patient is not medically cleared and still needs to be watched on medical basis.  20:45 SW spoke with patient's RN and Attending re status of being medically cleared. Patient is still not medically clear and unable to to assess at this time.  03:00 SW checked the status of patient. Per RN, patient is still not accessible and per Attending Doctor, patient is not yet medically cleared.  05:24 SW received phone call from attending reporting patient is medically cleared and being moved to room 16. SW attempted to assess patient; but not successful due to patient presenting lethargic and speech not comprehendible. Patient was sitting up and fell asleep and tilted to her side. Per PCNA, patient made the stated \"am I dead\". SW spoke with attending and informed him of patients presentation and patient is not able to be assessed at this time. SW discussed with attending that day shift SW will attempt to assess and if unable, assessment will be completed through collateral of med team and chart review. Patient will be a psych admit.  "

## 2024-05-31 NOTE — PROGRESS NOTES
Behavioral Restraint / Seclusion Face to Face Assessment    Patient Name:         Latrice Sofia  YOB: 1985  Medical Record #:   [id number]      Time Restraints were placed:  1742    Date Assessment was completed: 5/31/2024    Time patient was assessed:  1840     Description of behavior causing restraint/seclusion:  Concern for possible accidental self-harm as patient actively moving around and taking wires off, pulling at IVs due to her altered mental status    Type of intervention:  Mechanical restraint with soft restraints of the wrist    Patient's immediate situation: no signs of physical distress    Alternatives Attempted: Alternatives attempted and have been ineffective.    Contraindications for Restraints: Reviewed contraindications for continued restraint use and agree to on-going need.    Patent's reaction to intervention:  Patient remains altered at this time    Patient's medical condition: vital signs stable, normal circulation and breathing, positioned safely based upon medical and psychological issues, skin is protected, and hydration and nutrition are addressed    Patient's behavioral condition: Other patient continues to be altered and pulling at wires and lines even while in restraints.  Will plan to leave restraints at this time.    Plan: Continue restraints

## 2024-05-31 NOTE — PROGRESS NOTES
"Social Work Note  16:00 Social work received consult for 39 y/o F brought in by EMS and pink slipped by police for suicide attempt. Pt was brought from home after  called reporting pt had intentionally consumed an unknown amount of possibly benadryl and doxycycline. Pt 's report to provider he and pt had been arguing lately and today pt sent text to  stating \"tell the doctor 'thanks for the pills'\" and \"I will not be here when you get back\". Pt's RX of doxycycline was reportedly filled on April 2nd and  found bottle empty today. He is unsure whether pt has been taking the medication regularly or if she had hoarded for intentional consumption due to pt's messages. Pt has history of substance abuse as well and history of Hepatitis C reported. Pt was given 2 doses of Narcan per report from EMS however still unable to be aroused at time of meet and greet. Collateral provided from police at this time. No mental health or psychiatric history indicated from records. Social work will assess once pt is determined medically cleared.   "

## 2024-06-01 ENCOUNTER — APPOINTMENT (OUTPATIENT)
Dept: RADIOLOGY | Facility: HOSPITAL | Age: 39
End: 2024-06-01
Payer: MEDICAID

## 2024-06-01 VITALS
HEIGHT: 66 IN | BODY MASS INDEX: 26.89 KG/M2 | WEIGHT: 167.3 LBS | SYSTOLIC BLOOD PRESSURE: 133 MMHG | RESPIRATION RATE: 19 BRPM | DIASTOLIC BLOOD PRESSURE: 90 MMHG | OXYGEN SATURATION: 98 % | HEART RATE: 90 BPM | TEMPERATURE: 97.3 F

## 2024-06-01 PROCEDURE — 90839 PSYTX CRISIS INITIAL 60 MIN: CPT

## 2024-06-01 PROCEDURE — 70450 CT HEAD/BRAIN W/O DYE: CPT

## 2024-06-01 PROCEDURE — 90832 PSYTX W PT 30 MINUTES: CPT

## 2024-06-01 PROCEDURE — 70450 CT HEAD/BRAIN W/O DYE: CPT | Performed by: RADIOLOGY

## 2024-06-01 SDOH — HEALTH STABILITY: MENTAL HEALTH: WISH TO BE DEAD (PAST 1 MONTH): YES

## 2024-06-01 SDOH — HEALTH STABILITY: MENTAL HEALTH: ACTIVE SUICIDAL IDEATION WITH SOME INTENT TO ACT, WITHOUT SPECIFIC PLAN (PAST 1 MONTH): YES

## 2024-06-01 SDOH — HEALTH STABILITY: MENTAL HEALTH: ANXIETY SYMPTOMS: NO PROBLEMS REPORTED OR OBSERVED.

## 2024-06-01 SDOH — HEALTH STABILITY: MENTAL HEALTH: WHEN DID YOU TRY TO KILL YOURSELF?: PTA

## 2024-06-01 SDOH — HEALTH STABILITY: MENTAL HEALTH: ACTIVE SUICIDAL IDEATION WITH SPECIFIC PLAN AND INTENT (PAST 1 MONTH): YES

## 2024-06-01 SDOH — HEALTH STABILITY: MENTAL HEALTH: IN THE PAST WEEK, HAVE YOU BEEN HAVING THOUGHTS ABOUT KILLING YOURSELF?: YES

## 2024-06-01 SDOH — HEALTH STABILITY: MENTAL HEALTH: DEPRESSION SYMPTOMS: NO PROBLEMS REPORTED OR OBSERVED.

## 2024-06-01 SDOH — HEALTH STABILITY: MENTAL HEALTH: IN THE PAST FEW WEEKS, HAVE YOU WISHED YOU WERE DEAD?: YES

## 2024-06-01 SDOH — ECONOMIC STABILITY: HOUSING INSECURITY: FEELS SAFE LIVING IN HOME: YES

## 2024-06-01 SDOH — HEALTH STABILITY: MENTAL HEALTH: HOW DID YOU TRY TO KILL YOURSELF?: OVERDOSE PTA

## 2024-06-01 SDOH — HEALTH STABILITY: MENTAL HEALTH

## 2024-06-01 SDOH — HEALTH STABILITY: MENTAL HEALTH: HAVE YOU EVER TRIED TO KILL YOURSELF?: YES

## 2024-06-01 SDOH — HEALTH STABILITY: MENTAL HEALTH: SUICIDAL BEHAVIOR (LIFETIME): YES

## 2024-06-01 SDOH — HEALTH STABILITY: MENTAL HEALTH: NON-SPECIFIC ACTIVE SUICIDAL THOUGHTS (PAST 1 MONTH): YES

## 2024-06-01 SDOH — HEALTH STABILITY: MENTAL HEALTH: SUICIDAL BEHAVIOR (DESCRIPTION): OVERDOSE PTA

## 2024-06-01 SDOH — HEALTH STABILITY: MENTAL HEALTH: SUICIDAL BEHAVIOR (3 MONTHS): YES

## 2024-06-01 ASSESSMENT — LIFESTYLE VARIABLES: SUBSTANCE_ABUSE_PAST_12_MONTHS: YES

## 2024-06-01 NOTE — PROGRESS NOTES
This is a 38-year-old female whose care signed out to me by the overnight physician pending placement for inpatient psychiatric treatment.  She is here for suicidal ideation after intentional overdose on her medication.  She initially was significantly sedated after this overdose, mental status has been improving overnight.  Patient was medically cleared overnight as she was conversing normally and able to walk to and from the bathroom on her own.  She was signed out in the morning to me pending placement for psychiatric treatment.    I reassessed the patient at 910, she is tired but easily arousable.  She does mumble initially but when encouraged she is able to speak clearly and answer all questions appropriately.  She is frustrated because her mouth is dry and states she needs water to wet her lips.  This was given to her.  She is speaking clearly, repeat physical exam was performed, NIHSS is 0.  There is no facial droop.  There is no evidence of stroke.  I would not expect her to be able to speak clearly and overcome any difficulty speaking if this was due to stroke.  She remains medically clear.  Repeat vital signs show normal heart rate, normal vitals now. She remains medically clear.    She was accepted by Dr. Gonzalez at [facility].  She was transferred in stable condition for inpatient psychiatric assessment and management.    Physical Exam  General: well developed, well nourished adult female who is drowsy but easily arousable, oriented x 4, in no apparent distress  Eyes: sclera clear bilaterally, PERRL, EOMI  HENT: normocephalic, atraumatic. Pharynx without erythema or exudates, uvula midline.  Mucous membranes are dry.  CV: regular rate and rhythm, no murmur, no gallops, or rubs.   Resp: clear to ascultation bilaterally, no wheezes, rales, or rhonchi  GI: abdomen soft, nontender without rigidity or guarding, no peritoneal signs, abdomen is nondistended, no masses palpated  MSK: strength +5/5 to  upper and lower extremities bilaterally, no swelling of the extremities.  Neuro: no focal deficits, CN2-12 intact. Sensation fully intact. NIHSS 0  Psych: Drowsy but easily arousable, calm and cooperative with exam

## 2024-06-01 NOTE — PROGRESS NOTES
Social Work Note  Sw attempted assessment. Pt is still mumbling incoherently and lethargic. Sw obtained collateral information from night shift sw and nightshift tech as well as reviewed chart. Sw will reattempt assessment at later time.

## 2024-06-01 NOTE — PROGRESS NOTES
EPAT - Social Work Psychiatric Assessment    Arrival Details  Mode of Arrival: Ambulance  Admission Source: Home  Admission Type: Involuntary  EPAT Assessment Start Date: 06/01/24  EPAT Assessment Start Time: 0900  Name of : Lyndon WALLACE    History of Present Illness  Admission Reason: Intentional overdose  HPI: Pt is a 38 y.o. female who presented to [facility] ED via police after  called concerned pt had overdosed on medications. Pt initially alert with police then became lethargic and disoriented. Pt did state to police she took 60 Benedryl. Pt's  reported to staff that he thought pt overdosed on antidepressants and antibiotics. Poison Control was called and pt was monitored for sometime as it was unclear what pt exactly took. Pt tested positive for amphetamines and her BAL resulted at .081. Pt was also given Narcan. Pt was given Ativan and was placed in restraints after she was pulling on wires and her IV in an altered mental state. Sw reviewed chart which doesn't indicate a psychiatric hx however there is no documentation past 6 months ago. Collateral was received by pts . Sw reviewed triage risk score which indicated pt is at high risk. Sw assessed pt when pt was medically cleared.    SW Readmission Information   Readmission within 30 Days: No    Psychiatric Symptoms  Anxiety Symptoms: No problems reported or observed.  Depression Symptoms: No problems reported or observed.  Maria Luz Symptoms: No problems reported or observed.    Psychosis Symptoms  Hallucination Type: No problems reported or observed.  Delusion Type: No problems reported or observed.    Additional Symptoms - Adult  Generalized Anxiety Disorder: No problems reported or observed.  Obsessive Compulsive Disorder: No problems reported or observed.  Panic Attack: No problems reported or observed.  Post Traumatic Stress Disorder: No problems reported or observed.  Delirium: No problems reported or observed.    Past  Psychiatric History/Meds/Treatments  Past Psychiatric History: Poor historian. No psychiatric hx in chart review. Please note that chart does not have documentation prior to 6 months ago.  Past Psychiatric Meds/Treatments: Unknown  Past Violence/Victimization History: Unknown    Current Mental Health Contacts   Name/Phone Number: SAMANTHA  Provider Name/Phone Number: SAMANTHA (Pt initally indicated she had a provider then states she didnt, Unclear if pt is linked to services.)    Support System: Immediate family    Living Arrangement: House, Lives with someone (lives with )    Home Safety  Feels Safe Living in Home: Yes    Income Information  Employment Status for: Patient    [employer] Service/Education History  Current or Previous  Service: None  History of Learning Problems: No  History of School Behavior Problems: No    Social/Cultural History  Cultural Requests During Hospitalization: None  Spiritual Requests During Hospitalization: None    Legal  Legal Considerations: Patient/ Family Ability to Make Healthcare Decisions  Criminal Activity/ Legal Involvement Pertinent to Current Situation/ Hospitalization: None  Legal Concerns: None  Legal Comments: NA    Drug Screening  Have you used any substances (canabis, cocaine, heroin, hallucinogens, inhalants, etc.) in the past 12 months?: Yes (tested positive for amphetamines)  Is a toxicology screen needed?: Yes         Psychosocial  Psychosocial (WDL): Exceptions to WDL  Behaviors/Mood: Irritable, Not interactive, Sleeping, Withdrawn  Affect: Appropriate to circumstances    Orientation  Orientation Level: Oriented X4    General Appearance  Motor Activity: Unremarkable  Speech Pattern: Other (Comment) (frequent mumbling in between words, garbled)  General Attitude: Uninterested, Withdrawn  Appearance/Hygiene: Unremarkable    Thought Process  Content: Unremarkable  Perception: Unable to assess  Hallucination: None  Judgment/Insight: Impaired  Confusion:  Mild  Cognition: Poor judgement    Sleep Pattern  Sleep Pattern: Unable to assess    Risk Factors  Self Harm/Suicidal Ideation Plan: Overdose pta  Previous Self Harm/Suicidal Plans: Overdose pta    Violence Risk Assessment  Assessment of Violence: None noted  Thoughts of Harm to Others: No    Ability to Assess Risk Screen  Risk Screen - Ability to Assess: Able to be screened (limited due to garbled speech/lethargy)  Ask Suicide-Screening Questions  1. In the past few weeks, have you wished you were dead?: Yes  3. In the past week, have you been having thoughts about killing yourself?: Yes  4. Have you ever tried to kill yourself?: Yes  How did you try to kill yourself?: Overdose pta  When did you try to kill yourself?: pta  5. Are you having thoughts of killing yourself right now?:  (Unable to answer)  Calculated Risk Score: Potential Risk  [place] Suicide Severity Rating Scale (Screener/Recent Self-Report)  1. Wish to be Dead (Past 1 Month): Yes  2. Non-Specific Active Suicidal Thoughts (Past 1 Month): Yes  3. Active Suicidal Ideation with any Methods (Not Plan) Without Intent to Act (Past 1 Month): Yes  4. Active Suicidal Ideation with Some Intent to Act, Without Specific Plan (Past 1 Month): Yes  5. Active Suicidal Ideation with Specific Plan and Intent (Past 1 Month): Yes  6. Suicidal Behavior (Lifetime): Yes  6. Suicidal Behavior (3 Months): Yes  6. Suicidal Behavior (Description): Overdose pta  Calculated C-SSRS Risk Score (Lifetime/Recent): High Risk  Step 1: Risk Factors  Current & Past Psychiatric Dx: Mood disorder, Alcohol/substance abuse disorders  Presenting Symptoms: Impulsivity  Precipitants/Stressors: Triggering events leading to humiliation, shame, and/or despair (e.g. loss of relationship, financial or health status) (real or anticipated)  Change in Treatment: Non-compliant or not receiving treatment  Step 3: Suicidal Ideation Intensity  Most Severe Suicidal Ideation Identified: Overdose  Step 5:  "Documentation  Risk Level: High suicide risk    Psychiatric Impression and Plan of Care  Assessment and Plan: Pt is a 38 y.o. female who presented to [facility] ED via police after  called after pt texted him a statement indicating she had overdosed on medications prescribed to her. Pts  thought pt had overdosed on antidepressants and antibiotic but pt reported to police she took 60 Benedryl. Per report pt was initially alert with police then became lethargic and disoriented. In the ED pt's mental status was altered and she was given Ativan and placed in restraints due to pulling on her IV and wires. Poison Control was called and pt was monitored for sometime as it was unclear what pt exactly took. Pt also tested positive for amphetamines and her BAL resulted at .081. Sw made several attempts to assess pt after medical clearance however pt continued to be lethargic and present with mumbled speech. Pt does respond to painful stimuli from medical staff. Sw was able to get pt to engage in assessment with a lot of prompting. Pt is still presenting with garbled speech however is able to give short 2-3 word answers to direct simple questions. Pt indicates that she did not overdose in attempt to end her life instead just wanted to sleep. Pt does appear somewhat guarded answering \"no\" or \"nope\" to many questions.  Sw obtained collateral information due to limitations in assessment. Per  he and pt had been arguing the past few weeks. Pt had told her  that she had overdosed on medications given to her by a doctor and he would find her gone when he got home. Pt also questioned ED tech \"Am I dead?\" then stated she wished she was dead when he told her she was alive. Due to severity of pt's overdose, pt would benefit from inpatient psychatric placement. Dr Mar in agreement.  Plan Comments: inpatient psychiatric placement    Outcome/Disposition  Assessment, Recommendations and Risk Level Reviewed " with: Dr Mar  EPAT Assessment Completed Date: 06/01/24  EPAT Assessment Completed Time: 0918  Patient Disposition:  Adult Inpatient Psych

## 2024-06-01 NOTE — PROGRESS NOTES
Social Work Note  Referral made to [facility]  12:22 Pt declined at [facility]  Referral made to [facility].  13:26 Pt accepted at [facility] by Dr Gonzalez to their 1500unit. Nursing report number given to nurse.

## 2024-06-02 LAB — BACTERIA UR CULT: NORMAL

## 2024-06-04 LAB
ATRIAL RATE: 126 BPM
P AXIS: 63 DEGREES
P OFFSET: 198 MS
P ONSET: 159 MS
PR INTERVAL: 114 MS
Q ONSET: 216 MS
QRS COUNT: 21 BEATS
QRS DURATION: 106 MS
QT INTERVAL: 364 MS
QTC CALCULATION(BAZETT): 527 MS
QTC FREDERICIA: 466 MS
R AXIS: 71 DEGREES
T AXIS: 68 DEGREES
T OFFSET: 398 MS
VENTRICULAR RATE: 126 BPM

## 2024-09-19 ENCOUNTER — HOSPITAL ENCOUNTER (EMERGENCY)
Facility: HOSPITAL | Age: 39
Discharge: HOME | End: 2024-09-19
Attending: EMERGENCY MEDICINE
Payer: MEDICAID

## 2024-09-19 VITALS
HEIGHT: 65 IN | DIASTOLIC BLOOD PRESSURE: 87 MMHG | TEMPERATURE: 97.8 F | BODY MASS INDEX: 23.66 KG/M2 | OXYGEN SATURATION: 100 % | SYSTOLIC BLOOD PRESSURE: 104 MMHG | RESPIRATION RATE: 16 BRPM | WEIGHT: 142 LBS | HEART RATE: 99 BPM

## 2024-09-19 DIAGNOSIS — L98.9 SKIN LESIONS: Primary | ICD-10-CM

## 2024-09-19 PROCEDURE — 99283 EMERGENCY DEPT VISIT LOW MDM: CPT

## 2024-09-19 RX ORDER — HYDROXYZINE HYDROCHLORIDE 25 MG/1
50 TABLET, FILM COATED ORAL EVERY 4 HOURS PRN
Qty: 20 TABLET | Refills: 0 | Status: SHIPPED | OUTPATIENT
Start: 2024-09-19 | End: 2024-09-24

## 2024-09-19 ASSESSMENT — COLUMBIA-SUICIDE SEVERITY RATING SCALE - C-SSRS
2. HAVE YOU ACTUALLY HAD ANY THOUGHTS OF KILLING YOURSELF?: NO
1. IN THE PAST MONTH, HAVE YOU WISHED YOU WERE DEAD OR WISHED YOU COULD GO TO SLEEP AND NOT WAKE UP?: NO
6. HAVE YOU EVER DONE ANYTHING, STARTED TO DO ANYTHING, OR PREPARED TO DO ANYTHING TO END YOUR LIFE?: NO

## 2024-09-19 NOTE — ED NOTES
Pt eloped prior to DC papers and plan of care.  Pt asked prior to leaving door if they would like to wait or if there was anything further.  Family member declined and both left ER.  MD aware     Carly Hall RN  09/19/24 [id number]

## 2024-09-19 NOTE — ED NOTES
Pt and pt family at bedside continue to explore skin.  Both picking at head/ squeezing.  Dressings offered.  Pt declines.      Carly Hall RN  09/19/24 [id number]

## 2024-09-19 NOTE — ED PROVIDER NOTES
"EMERGENCY DEPARTMENT ENCOUNTER      Pt Name: Latrice Sofia  MRN: [id number]  Birthdate 1985  Date of evaluation: 9/19/2024  ED Provider: Lana Madrigal DO     CHIEF COMPLAINT       Chief Complaint   Patient presents with    Skin Problem     Generalized skin lesions.  Worsening discomfort and locations.         HISTORY OF PRESENT ILLNESS    Latrice Sofia is a 39 y.o. who presents to the emergency department with concern for skin infection.  She states for the past 2 years she has been having parasites in her skin.  She has seen dermatology who told her \"it was all in my head\".  Today she states that she saw something coming out of her skin.  Her significant other is starting to have lesions as well.    REVIEW OF SYSTEMS     Focused ROS performed and negative other than as listed in HPI    PAST MEDICAL HISTORY   No past medical history on file.    SURGICAL HISTORY     No past surgical history on file.    CURRENT MEDICATIONS       Discharge Medication List as of 9/19/2024  2:09 AM          ALLERGIES     Pineapple    FAMILY HISTORY     No family history on file.     SOCIAL HISTORY       Social History     Socioeconomic History    Marital status: Unknown   Tobacco Use    Smoking status: Unknown     Social Determinants of Health     Financial Resource Strain: Patient Declined (8/29/2023)    Received from [facility]    Overall Financial Resource Strain (CARDIA)     Difficulty of Paying Living Expenses: Patient declined   Food Insecurity: Patient Declined (8/29/2023)    Received from [facility]    Hunger Vital Sign     Worried About Running Out of Food in the Last Year: Patient declined     Ran Out of Food in the Last Year: Patient declined   Transportation Needs: Patient Declined (8/29/2023)    Received from [facility]    PRAPARE - Transportation     Lack of Transportation (Medical): Patient declined     Lack of Transportation (Non-Medical): Patient " declined   Physical Activity: Sufficiently Active (8/29/2023)    Received from [facility]    Exercise Vital Sign     Days of Exercise per Week: 7 days     Minutes of Exercise per Session: 60 min   Stress: No Stress Concern Present (8/29/2023)    [facility]    [place] [employer] Occup[employer] - Occupational Stress Questionnaire     Feeling of Stress : Not at all   Social Connections: Unknown (8/29/2023)    Received from [facility]    Social Connection and Isolation Panel [NHANES]     Frequency of Communication with Friends and Family: Patient declined     Frequency of Social Gatherings with Friends and Family: Patient declined     Attends [employer] Services: Patient declined     Active Member of Clubs or Organizations: Patient declined     Attends Club or Organization Meetings: Patient declined     Marital Status:    Housing Stability: Unknown (8/29/2023)    Received from [facility]    Housing Stability Vital Sign     Unable to Pay for Housing in the Last Year: Patient refused     Unstable Housing in the Last Year: Patient refused       PHYSICAL EXAM       ED Triage Vitals [09/19/24 0101]   Temperature Heart Rate Respirations BP   36.6 °C (97.8 °F) 99 16 104/87      Pulse Ox Temp Source Heart Rate Source Patient Position   100 % Temporal Monitor Sitting      BP Location FiO2 (%)     Left arm --        General: Appears well, no acute distress, alert  Head: Head atraumatic; normocephalic  Eyes: normal inspection; no icterus  ENT: mucosa moist without lesion  Neck: Normal inspection, no meningeal signs  Resp: Normal breath sounds, no wheeze or crackles; No respiratory distress  Chest Wall: no tenderness or deformity  Heart: Heart rate and rhythm regular; No Murmurs  MSK: Normal appearance; Moves all extremities; No Pedal edema  Neuro: Alert; no focal deficits, moves all extremities  Psych: Mood  and Affect normal  Skin: Color appropriate; warm; Dry; various scarred and scabbed over lesions on forehead and chest, there is a small open abrasion superior frontal left scalp with no purulence or evidence of insect infestation    EMERGENCY DEPARTMENT COURSE/MDM   Bedside ultrasound utilized which demonstrated no evidence of fluid collection, abscess, cellulitis, or mobile foreign body within the scalp itself.  The patient was able to visualize the ultrasound as it was being performed.  Patient is given a referral to infectious disease and a prescription for Atarax.  Discharged in stable condition.    Diagnoses as of 09/19/24 0324   Skin lesions       Meds Administered:  Medications - No data to display    PROCEDURES   Unless otherwise noted below, none  Procedures      FINAL IMPRESSION      1. Skin lesions          DISPOSITION    Discharge 09/19/2024 02:04:34 AM    DISCHARGE   PATIENT REFERRED TO:  GERHARD Batres-CNP  [address] [place] [facility] [address]  [phone]          Amarjit Willett MD  [address] [facility]  [facility] [address]  [phone]            DISCHARGE MEDICATIONS:  Discharge Medication List as of 9/19/2024  2:09 AM        START taking these medications    Details   hydrOXYzine HCL (Atarax) 25 mg tablet Take 2 tablets (50 mg) by mouth every 4 hours if needed for itching for up to 5 days., Starting Thu 9/19/2024, Until Tue 9/24/2024 at 2359, Normal              The patient is discharged back to their place of residence.  Discharge diagnosis, instructions and plan were discussed and understood. At the time of discharge the patient was comfortable and was in no apparent distress. Patient is aware of diagnostic uncertainty and was notified though testing is negative here, there is a very small chance that pathology may be missed.  The patient understands these risks and the patient /family understood to return immediately to the emergency department if the symptoms worsen or if they  have any additional concerns.      (Comment: Please note this report has been produced using speech recognition software and may contain errors related to that system including errors in grammar, punctuation, and spelling, as well as words and phrases that may be inappropriate.  If there are any questions or concerns please feel free to contact the dictating provider for clarification.)    Lana Madrigal DO (electronically signed)  Emergency Medicine Physician    History Limited by: None  Independent history obtained from: None  External records reviewed: External ED note numerous prior ED notes from [facility] for similar complaints  Diagnostics interpreted by me:  bedside US  Discussions with other clinicians: None  Chronic conditions impacting care: None  Social determinants of health affecting care: None  Diagnostic tests considered but not performed: n/a  ED Medications managed:  Medications - No data to display    Prescription drugs considered:  hydroxyzine             Lana Madrigal DO  09/19/24 0327

## 2024-11-05 ENCOUNTER — HOSPITAL ENCOUNTER (EMERGENCY)
Facility: HOSPITAL | Age: 39
Discharge: OTHER INSTITUTION | End: 2024-11-05
Attending: STUDENT IN AN ORGANIZED HEALTH CARE EDUCATION/TRAINING PROGRAM
Payer: MEDICAID

## 2024-11-05 ENCOUNTER — APPOINTMENT (OUTPATIENT)
Dept: CARDIOLOGY | Facility: HOSPITAL | Age: 39
End: 2024-11-05
Payer: MEDICAID

## 2024-11-05 VITALS
OXYGEN SATURATION: 97 % | RESPIRATION RATE: 16 BRPM | HEIGHT: 65 IN | HEART RATE: 99 BPM | SYSTOLIC BLOOD PRESSURE: 92 MMHG | BODY MASS INDEX: 23.32 KG/M2 | WEIGHT: 140 LBS | TEMPERATURE: 98.2 F | DIASTOLIC BLOOD PRESSURE: 54 MMHG

## 2024-11-05 DIAGNOSIS — F15.929: ICD-10-CM

## 2024-11-05 DIAGNOSIS — R45.851 SUICIDAL IDEATION: Primary | ICD-10-CM

## 2024-11-05 LAB
ALBUMIN SERPL BCP-MCNC: 4.8 G/DL (ref 3.4–5)
ALP SERPL-CCNC: 56 U/L (ref 33–110)
ALT SERPL W P-5'-P-CCNC: 11 U/L (ref 7–45)
AMPHETAMINES UR QL SCN: ABNORMAL
ANION GAP SERPL CALCULATED.3IONS-SCNC: 15 MMOL/L (ref 10–20)
APAP SERPL-MCNC: <10 UG/ML
AST SERPL W P-5'-P-CCNC: 17 U/L (ref 9–39)
ATRIAL RATE: 103 BPM
BARBITURATES UR QL SCN: ABNORMAL
BASOPHILS # BLD AUTO: 0.09 X10*3/UL (ref 0–0.1)
BASOPHILS NFR BLD AUTO: 1.2 %
BENZODIAZ UR QL SCN: ABNORMAL
BILIRUB SERPL-MCNC: 0.5 MG/DL (ref 0–1.2)
BUN SERPL-MCNC: 14 MG/DL (ref 6–23)
BZE UR QL SCN: ABNORMAL
CALCIUM SERPL-MCNC: 9.7 MG/DL (ref 8.6–10.3)
CANNABINOIDS UR QL SCN: ABNORMAL
CHLORIDE SERPL-SCNC: 105 MMOL/L (ref 98–107)
CK SERPL-CCNC: 151 U/L (ref 0–215)
CO2 SERPL-SCNC: 23 MMOL/L (ref 21–32)
CREAT SERPL-MCNC: 0.81 MG/DL (ref 0.5–1.05)
EGFRCR SERPLBLD CKD-EPI 2021: >90 ML/MIN/1.73M*2
EOSINOPHIL # BLD AUTO: 0.12 X10*3/UL (ref 0–0.7)
EOSINOPHIL NFR BLD AUTO: 1.6 %
ERYTHROCYTE [DISTWIDTH] IN BLOOD BY AUTOMATED COUNT: 11.7 % (ref 11.5–14.5)
ETHANOL SERPL-MCNC: 104 MG/DL
FENTANYL+NORFENTANYL UR QL SCN: ABNORMAL
GLUCOSE SERPL-MCNC: 86 MG/DL (ref 74–99)
HCG UR QL IA.RAPID: NEGATIVE
HCT VFR BLD AUTO: 42.6 % (ref 36–46)
HGB BLD-MCNC: 13.8 G/DL (ref 12–16)
HOLD SPECIMEN: NORMAL
IMM GRANULOCYTES # BLD AUTO: 0.02 X10*3/UL (ref 0–0.7)
IMM GRANULOCYTES NFR BLD AUTO: 0.3 % (ref 0–0.9)
LYMPHOCYTES # BLD AUTO: 1.86 X10*3/UL (ref 1.2–4.8)
LYMPHOCYTES NFR BLD AUTO: 24.5 %
MCH RBC QN AUTO: 30.1 PG (ref 26–34)
MCHC RBC AUTO-ENTMCNC: 32.4 G/DL (ref 32–36)
MCV RBC AUTO: 93 FL (ref 80–100)
METHADONE UR QL SCN: ABNORMAL
MONOCYTES # BLD AUTO: 0.42 X10*3/UL (ref 0.1–1)
MONOCYTES NFR BLD AUTO: 5.5 %
NEUTROPHILS # BLD AUTO: 5.07 X10*3/UL (ref 1.2–7.7)
NEUTROPHILS NFR BLD AUTO: 66.9 %
NRBC BLD-RTO: 0 /100 WBCS (ref 0–0)
OPIATES UR QL SCN: ABNORMAL
OXYCODONE+OXYMORPHONE UR QL SCN: ABNORMAL
P AXIS: 75 DEGREES
P OFFSET: 200 MS
P ONSET: 144 MS
PCP UR QL SCN: ABNORMAL
PLATELET # BLD AUTO: 258 X10*3/UL (ref 150–450)
POTASSIUM SERPL-SCNC: 3.3 MMOL/L (ref 3.5–5.3)
PR INTERVAL: 158 MS
PROT SERPL-MCNC: 8.1 G/DL (ref 6.4–8.2)
Q ONSET: 223 MS
QRS COUNT: 17 BEATS
QRS DURATION: 86 MS
QT INTERVAL: 358 MS
QTC CALCULATION(BAZETT): 468 MS
QTC FREDERICIA: 429 MS
R AXIS: 55 DEGREES
RBC # BLD AUTO: 4.58 X10*6/UL (ref 4–5.2)
SALICYLATES SERPL-MCNC: <3 MG/DL
SODIUM SERPL-SCNC: 140 MMOL/L (ref 136–145)
T AXIS: 62 DEGREES
T OFFSET: 402 MS
VENTRICULAR RATE: 103 BPM
WBC # BLD AUTO: 7.6 X10*3/UL (ref 4.4–11.3)

## 2024-11-05 PROCEDURE — 80143 DRUG ASSAY ACETAMINOPHEN: CPT | Performed by: STUDENT IN AN ORGANIZED HEALTH CARE EDUCATION/TRAINING PROGRAM

## 2024-11-05 PROCEDURE — 85025 COMPLETE CBC W/AUTO DIFF WBC: CPT | Performed by: STUDENT IN AN ORGANIZED HEALTH CARE EDUCATION/TRAINING PROGRAM

## 2024-11-05 PROCEDURE — 36415 COLL VENOUS BLD VENIPUNCTURE: CPT | Performed by: STUDENT IN AN ORGANIZED HEALTH CARE EDUCATION/TRAINING PROGRAM

## 2024-11-05 PROCEDURE — 82550 ASSAY OF CK (CPK): CPT | Performed by: STUDENT IN AN ORGANIZED HEALTH CARE EDUCATION/TRAINING PROGRAM

## 2024-11-05 PROCEDURE — 90832 PSYTX W PT 30 MINUTES: CPT

## 2024-11-05 PROCEDURE — 90839 PSYTX CRISIS INITIAL 60 MIN: CPT

## 2024-11-05 PROCEDURE — 81025 URINE PREGNANCY TEST: CPT | Performed by: STUDENT IN AN ORGANIZED HEALTH CARE EDUCATION/TRAINING PROGRAM

## 2024-11-05 PROCEDURE — 80307 DRUG TEST PRSMV CHEM ANLYZR: CPT | Performed by: STUDENT IN AN ORGANIZED HEALTH CARE EDUCATION/TRAINING PROGRAM

## 2024-11-05 PROCEDURE — 93005 ELECTROCARDIOGRAM TRACING: CPT

## 2024-11-05 PROCEDURE — 84450 TRANSFERASE (AST) (SGOT): CPT | Performed by: STUDENT IN AN ORGANIZED HEALTH CARE EDUCATION/TRAINING PROGRAM

## 2024-11-05 PROCEDURE — 2500000002 HC RX 250 W HCPCS SELF ADMINISTERED DRUGS (ALT 637 FOR MEDICARE OP, ALT 636 FOR OP/ED): Performed by: EMERGENCY MEDICINE

## 2024-11-05 PROCEDURE — 2500000001 HC RX 250 WO HCPCS SELF ADMINISTERED DRUGS (ALT 637 FOR MEDICARE OP): Performed by: EMERGENCY MEDICINE

## 2024-11-05 PROCEDURE — 99285 EMERGENCY DEPT VISIT HI MDM: CPT

## 2024-11-05 RX ORDER — POTASSIUM CHLORIDE 1.5 G/1.58G
40 POWDER, FOR SOLUTION ORAL ONCE
Status: DISCONTINUED | OUTPATIENT
Start: 2024-11-05 | End: 2024-11-05 | Stop reason: HOSPADM

## 2024-11-05 RX ORDER — POTASSIUM CHLORIDE 20 MEQ/1
40 TABLET, EXTENDED RELEASE ORAL ONCE
Status: DISCONTINUED | OUTPATIENT
Start: 2024-11-05 | End: 2024-11-05

## 2024-11-05 SDOH — HEALTH STABILITY: MENTAL HEALTH: WAS THIS WITHIN THE PAST THREE MONTHS?: YES

## 2024-11-05 SDOH — HEALTH STABILITY: MENTAL HEALTH: BEHAVIORAL HEALTH(WDL): EXCEPTIONS TO WDL

## 2024-11-05 SDOH — HEALTH STABILITY: MENTAL HEALTH: BEHAVIORS/MOOD: CALM;COOPERATIVE

## 2024-11-05 SDOH — HEALTH STABILITY: MENTAL HEALTH: IN THE PAST FEW WEEKS, HAVE YOU WISHED YOU WERE DEAD?: NO

## 2024-11-05 SDOH — HEALTH STABILITY: MENTAL HEALTH: BEHAVIORAL HEALTH(WDL): WITHIN DEFINED LIMITS

## 2024-11-05 SDOH — HEALTH STABILITY: MENTAL HEALTH: SUICIDAL BEHAVIOR (3 MONTHS): YES

## 2024-11-05 SDOH — HEALTH STABILITY: MENTAL HEALTH: SUICIDAL BEHAVIOR (LIFETIME): YES

## 2024-11-05 SDOH — ECONOMIC STABILITY: HOUSING INSECURITY: FEELS SAFE LIVING IN HOME: YES

## 2024-11-05 SDOH — HEALTH STABILITY: MENTAL HEALTH: HAVE YOU HAD THESE THOUGHTS AND HAD SOME INTENTION OF ACTING ON THEM?: YES

## 2024-11-05 SDOH — HEALTH STABILITY: MENTAL HEALTH: ACTIVE SUICIDAL IDEATION WITH SPECIFIC PLAN AND INTENT (PAST 1 MONTH): YES

## 2024-11-05 SDOH — HEALTH STABILITY: MENTAL HEALTH: NON-SPECIFIC ACTIVE SUICIDAL THOUGHTS (PAST 1 MONTH): NO

## 2024-11-05 SDOH — HEALTH STABILITY: MENTAL HEALTH
SUICIDAL BEHAVIOR (DESCRIPTION): PT MADE AN ATTEMPT TO JUMP OFF A BRIDGE THIS EVENING POST VERBAL ALTERCATION WITH SPOUSE (ETOH AND METH USE)

## 2024-11-05 SDOH — HEALTH STABILITY: MENTAL HEALTH: ARE YOU HAVING THOUGHTS OF KILLING YOURSELF RIGHT NOW?: NO

## 2024-11-05 SDOH — HEALTH STABILITY: MENTAL HEALTH: BEHAVIORS/MOOD: SLEEPING

## 2024-11-05 SDOH — HEALTH STABILITY: MENTAL HEALTH: ACTIVE SUICIDAL IDEATION WITH SOME INTENT TO ACT, WITHOUT SPECIFIC PLAN (PAST 1 MONTH): NO

## 2024-11-05 SDOH — HEALTH STABILITY: MENTAL HEALTH: DEPRESSION SYMPTOMS: NO PROBLEMS REPORTED OR OBSERVED.

## 2024-11-05 SDOH — HEALTH STABILITY: MENTAL HEALTH
HAVE YOU STARTED TO WORK OUT OR WORKED OUT THE DETAILS OF HOW TO KILL YOURSELF? DO YOU INTENT TO CARRY OUT THIS PLAN?: YES

## 2024-11-05 SDOH — HEALTH STABILITY: MENTAL HEALTH: BEHAVIORS/MOOD: AGITATED;OTHER (COMMENT)

## 2024-11-05 SDOH — HEALTH STABILITY: MENTAL HEALTH
OTHER SUICIDE PRECAUTIONS INCLUDE: PATIENT PLACED IN AN EASILY OBSERVABLE ROOM WITH DOOR/CURTAIN REMAINING OPEN;PATIENT PLACED IN GOWN (SNAPS OR PAPER GOWNS PREFERRED) AND WANDED;PATIENT PLACED IN PSYCH SAFE ROOM (IF AVAILABLE);HOURLY BEHAVIORAL ASSESSMENT PERFORMED;PERSONAL BELONGINGS SE

## 2024-11-05 SDOH — HEALTH STABILITY: MENTAL HEALTH: IN THE PAST FEW WEEKS, HAVE YOU FELT THAT YOU OR YOUR FAMILY WOULD BE BETTER OFF IF YOU WERE DEAD?: NO

## 2024-11-05 SDOH — HEALTH STABILITY: MENTAL HEALTH: WISH TO BE DEAD (PAST 1 MONTH): NO

## 2024-11-05 SDOH — HEALTH STABILITY: MENTAL HEALTH: FOR HIGH RISK PATIENTS: 1:1 PATIENT OBSERVER AT ALL TIMES

## 2024-11-05 SDOH — HEALTH STABILITY: MENTAL HEALTH: SUICIDE ASSESSMENT: ADULT (C-SSRS)

## 2024-11-05 SDOH — HEALTH STABILITY: MENTAL HEALTH: HAVE YOU BEEN THINKING ABOUT HOW YOU MIGHT DO THIS?: YES

## 2024-11-05 SDOH — ECONOMIC STABILITY: GENERAL

## 2024-11-05 SDOH — HEALTH STABILITY: MENTAL HEALTH: HAVE YOU ACTUALLY HAD ANY THOUGHTS OF KILLING YOURSELF?: YES

## 2024-11-05 SDOH — HEALTH STABILITY: MENTAL HEALTH: IN THE PAST WEEK, HAVE YOU BEEN HAVING THOUGHTS ABOUT KILLING YOURSELF?: NO

## 2024-11-05 SDOH — HEALTH STABILITY: MENTAL HEALTH: BEHAVIORS/MOOD: COOPERATIVE;SAD;TEARFUL

## 2024-11-05 SDOH — SOCIAL STABILITY: SOCIAL NETWORK: EMOTIONAL SUPPORT GIVEN: REASSURE

## 2024-11-05 SDOH — HEALTH STABILITY: MENTAL HEALTH: HAVE YOU EVER DONE ANYTHING, STARTED TO DO ANYTHING, OR PREPARED TO DO ANYTHING TO END YOUR LIFE?: YES

## 2024-11-05 SDOH — HEALTH STABILITY: MENTAL HEALTH: ANXIETY SYMPTOMS: NO PROBLEMS REPORTED OR OBSERVED.

## 2024-11-05 SDOH — HEALTH STABILITY: MENTAL HEALTH: HAVE YOU EVER TRIED TO KILL YOURSELF?: NO

## 2024-11-05 SDOH — HEALTH STABILITY: MENTAL HEALTH: HAVE YOU WISHED YOU WERE DEAD OR WISHED YOU COULD GO TO SLEEP AND NOT WAKE UP?: YES

## 2024-11-05 ASSESSMENT — PAIN SCALES - GENERAL
PAINLEVEL_OUTOF10: 0 - NO PAIN

## 2024-11-05 ASSESSMENT — COLUMBIA-SUICIDE SEVERITY RATING SCALE - C-SSRS
6. HAVE YOU EVER DONE ANYTHING, STARTED TO DO ANYTHING, OR PREPARED TO DO ANYTHING TO END YOUR LIFE?: YES
4. HAVE YOU HAD THESE THOUGHTS AND HAD SOME INTENTION OF ACTING ON THEM?: YES
2. HAVE YOU ACTUALLY HAD ANY THOUGHTS OF KILLING YOURSELF?: YES
1. IN THE PAST MONTH, HAVE YOU WISHED YOU WERE DEAD OR WISHED YOU COULD GO TO SLEEP AND NOT WAKE UP?: YES
6. HAVE YOU EVER DONE ANYTHING, STARTED TO DO ANYTHING, OR PREPARED TO DO ANYTHING TO END YOUR LIFE?: YES
5. HAVE YOU STARTED TO WORK OUT OR WORKED OUT THE DETAILS OF HOW TO KILL YOURSELF? DO YOU INTEND TO CARRY OUT THIS PLAN?: YES
6. HAVE YOU EVER DONE ANYTHING, STARTED TO DO ANYTHING, OR PREPARED TO DO ANYTHING TO END YOUR LIFE?: JUMP OFF A BRIDGE

## 2024-11-05 ASSESSMENT — LIFESTYLE VARIABLES
SUBSTANCE_ABUSE_PAST_12_MONTHS: YES
PRESCIPTION_ABUSE_PAST_12_MONTHS: NO

## 2024-11-05 ASSESSMENT — PAIN - FUNCTIONAL ASSESSMENT: PAIN_FUNCTIONAL_ASSESSMENT: 0-10

## 2024-11-05 NOTE — PROGRESS NOTES
Pt BAL resulted .104 Pt can be assessed once to legal limit (appx 3 hours). SW requested a CK level be added. Provider placed order for CPK instead of a CK. SW requested charge PRICILA Lewis ask ED Doc Sales to add CK since pt endorsed using meth

## 2024-11-05 NOTE — PROGRESS NOTES
I received Latrice Sofia in signout from Dr. hansen.  Please see the previous note for all HPI, PE and MDM up to the time of signout at 0600.    In brief Latrice Sofia is an 39 y.o. female presenting for   Chief Complaint   Patient presents with    Psychiatric Evaluation     Patient got into verbal altercation with  and threatened to kill herself by jumping off bridge. PD found patient on road sitting on edge of bridge. Patient was talked to and convinced to come to ED.    .  At the time of signout we were awaiting:sw consult        Clinical impression:  1. Suicidal ideation        2. Methamphetamine intoxication (Multi)            DISPOSITION/PLAN   DISPOSITION Transfer To Another Facility 11/05/2024 07:32:44 AM     Accepted for transfer by Dr. Antoine Blandon MD   I prescribed the following medications:  New Prescriptions    No medications on file       PATIENT REFERRED TO:  No follow-up provider specified.

## 2024-11-05 NOTE — ED PROVIDER NOTES
EMERGENCY DEPARTMENT ENCOUNTER      Pt Name: Latrice Sofia  MRN: [id number]  Birthdate 1985  Date of evaluation: 11/5/2024  Provider: Christy De Souza DO         Chief Complaint   Patient presents with    Psychiatric Evaluation     Patient got into verbal altercation with  and threatened to kill herself by jumping off bridge. PD found patient on road sitting on edge of bridge. Patient was talked to and convinced to come to ED.        HPI     39-year-old female brought in by PD for evaluation for suicidal ideation.  She got into a verbal altercation with her , and stated she was going to kill herself by jumping off a bridge,  was concerned and called PD who [place] [address], [place].  She endorses drinking and using methamphetamines today, at the time of my evaluation she is sleeping comfortably, and wakes easily and states she just wants to go home.              Patient History   History reviewed. No pertinent past medical history.  History reviewed. No pertinent surgical history.  No family history on file.  Social History     Tobacco Use    Smoking status: Every Day     Current packs/day: 1.00     Types: Cigarettes    Smokeless tobacco: Not on file   Substance Use Topics    Alcohol use: Not on file    Drug use: Yes     Types: Amphetamines       Physical Exam   ED Triage Vitals [11/05/24 0128]   Temperature Heart Rate Respirations BP   35.8 °C (96.5 °F) (!) 118 16 116/84      Pulse Ox Temp Source Heart Rate Source Patient Position   100 % Temporal Monitor Lying      BP Location FiO2 (%)     Left arm --       Physical Exam  Vitals and nursing note reviewed.   Constitutional:       General: She is not in acute distress.     Appearance: She is well-developed.   HENT:      Head: Normocephalic and atraumatic.   Pulmonary:      Effort: Pulmonary effort is normal. No respiratory distress.   Abdominal:      Palpations: Abdomen is soft.      Tenderness: There is no abdominal  tenderness.   Musculoskeletal:         General: No swelling.      Cervical back: Neck supple.   Skin:     General: Skin is warm and dry.      Capillary Refill: Capillary refill takes less than 2 seconds.   Neurological:      Mental Status: She is alert.   Psychiatric:         Mood and Affect: Affect is blunt.         Behavior: Behavior is withdrawn.      Comments: Psychomotor agitation         Results:  Abnormal Labs Reviewed   COMPREHENSIVE METABOLIC PANEL - Abnormal; Notable for the following components:       Result Value    Potassium 3.3 (*)     All other components within normal limits   DRUG SCREEN,URINE - Abnormal; Notable for the following components:    Amphetamine Screen, Urine Presumptive Positive (*)     All other components within normal limits    Narrative:     Drug screen results are presumptive and should not be used to assess   compliance with prescribed medication. Contact the performing [facility] laboratory   to add-on definitive confirmatory testing if clinically indicated.    Toxicology screening results are reported qualitatively. The concentration must   be greater than or equal to the cutoff to be reported as positive. The concentration   at which the screening test can detect an individual drug or metabolite varies.   The absence of expected drug(s) and/or drug metabolite(s) may indicate non-compliance,   inappropriate timing of specimen collection relative to drug administration, poor drug   absorption, diluted/adulterated urine, or limitations of testing. For medical purposes   only; not valid for forensic use.    Interpretive questions should be directed to the laboratory medical directors.   ACUTE TOXICOLOGY PANEL, BLOOD - Abnormal; Notable for the following components:    Alcohol 104 (*)     All other components within normal limits       All other labs were normal or not returned as of the time of this dictation.     No orders to display         ED Course & MDM   Latrice Sofia is a 39 y.o.  female presenting to the ED for evaluation of had concerns including Psychiatric Evaluation (Patient got into verbal altercation with  and threatened to kill herself by jumping off bridge. PD found patient on road sitting on edge of bridge. Patient was talked to and convinced to come to ED. ).. External records reviewed: I reviewed external records including outpatient, PCP records, and prior discharge summaries.    Medical Decision Making  39-year-old female presenting to the emergency department after making suicidal statements, psych workup initiated.  Urine drug screen positive for methamphetamines, which the patient endorses.  Ethanol is around 100.  High risk patient with threat of self-harm and demonstrated follow-through.  With her positive amphetamines, CK was obtained as well.  CK was not elevated.  Discussed with EPAT who evaluated this patient and is recommending inpatient admission.  Signed out to the dayshift provider, Dr. Carrillo, pending placement.        ED Course as of 11/05/24 0738   Tue Nov 05, 2024   0149 EKG obtained at 0 139 and read by me shows sinus tachycardia, 103 bpm, normal [place] interval, normal QRS, QTc is 468, with an upright axis, and no ST segment elevation, depression, or other signs of ischemia or arrhythmia [AS]   0349 Creatine Kinase: 151 [AS]   0541 Medically clear for EPAT evaluation and placement [AS]      ED Course User Index  [AS] Christy De Souza DO         Diagnoses as of 11/05/24 0738   Suicidal ideation   Methamphetamine intoxication (Multi)           Procedure  Procedures            Christy De Souza DO  Emergency Medicine    The above documentation was completed with the use of speech recognition software. It may contain dictation errors secondary to limitations of the software.      ED Medications administered this visit:  Medications - No data to display    New Prescriptions from this visit:    New Prescriptions    No medications on file       Final Impression: No  diagnosis found.      (Please note that portions of this note were completed with a voice recognition program.  Efforts were made to edit the dictations but occasionally words are mis-transcribed.)     Christy De Souza DO  11/05/24 0738

## 2024-11-05 NOTE — PROGRESS NOTES
EPAT - Social Work Psychiatric Assessment    Arrival Details  Mode of Arrival: Ambulatory  Admission Source: Home  Admission Type: Involuntary (Pt was pink slipped by  LCSO)  EPAT Assessment Start Date: 11/05/24  EPAT Assessment Start Time: 0530  Name of : TEOFILO Patino [facility]    History of Present Illness  Admission Reason: Suicide attempt (intent and plan)  HPI: Pt is a 38 y/o   who presented to [facility] with [facility] after a 911 call was placed by pt , advising of a verbal altercation and pt threatened to jump off a bridge. Pt was located off [address] sitting on the edge of the bridge attempting to jump, taking 20 mintues for pt to cooperate and come down to police. SW reviewed prior provider, traige and ED Doc notes prior to assessment. Triage is not indicate level of risk at time of arrival (pt wa intoxicated).    SW Readmission Information   Readmission within 30 Days: No    Psychiatric Symptoms  Anxiety Symptoms: No problems reported or observed.  Depression Symptoms: No problems reported or observed. (Pt was crying and tearful shortly after arriving at the ED)  Maria Luz Symptoms: No problems reported or observed.    Psychosis Symptoms  Hallucination Type: No problems reported or observed.  Delusion Type: No problems reported or observed.    Additional Symptoms - Adult  Generalized Anxiety Disorder: No problems reported or observed.  Obsessive Compulsive Disorder: No problems reported or observed.  Panic Attack: No problems reported or observed.  Post Traumatic Stress Disorder: No problems reported or observed. (Pt denied any/all trauma hx past or present)  Delirium: No problems reported or observed.    Past Psychiatric History/Meds/Treatments  Past Psychiatric History: Pt reported having a MH dx of depression  Past Psychiatric Meds/Treatments: Pt reported not taking any meds and not seeing anyone in the community (Pt reported having one prior inpatient psychiatrict  hospitalization, [facility] (per RN report, 9/2024, post pt mom passing away 8/2024). Per chart review, pt had an inpatient service 5/31 (). Both attempts were via OD on pills)  Past Violence/Victimization History: Pt denies any/all past or current hx of trauma or abuse; physically, mentally, emotionally, sexually, community violence or natural disaster    Current Mental Health Contacts   Name/Phone Number: SAMANTHA   Last Appointment Date: NA  Provider Name/Phone Number: SAMANTHA  Provider Last Appointment Date: NA    Support System: Other (Comment) (Pt reported herself as supportive)    Living Arrangement: House, Lives with someone (Pt reported living with her spouse (Krunal) and her mother in law)    Home Safety  Feels Safe Living in Home: Yes  Potentially Unsafe Housing Conditions: Unable to Assess    Income Information  Employment Status for: Patient  Employment Status: Unemployed  Financial Concerns: None  Who Manages Finances if Patient Unable: spouse  Employment/ Finance Comments: None reported    Miltary Service/Education History  Current or Previous  Service: None  Education Level: High school  History of Learning Problems: No  History of School Behavior Problems: No  School History: NA    Social/Cultural History  Social History: NA  Cultural Requests During Hospitalization: NA  Spiritual Requests During Hospitalization: NA  Important Activities:  (Pt reported none)    Legal  Legal Considerations: Patient/ Family Ability to Make Healthcare Decisions  Criminal Activity/ Legal Involvement Pertinent to Current Situation/ Hospitalization: None reported  Legal Concerns: None reported  Legal Comments: None reported    Drug Screening  Have you used any substances (canabis, cocaine, heroin, hallucinogens, inhalants, etc.) in the past 12 months?: Yes (ETOH and methampethamine. Pt toxic screen positive for ETOH and amp)  Have you used any prescription drugs other than prescribed in the past 12 months?:  No  Is a toxicology screen needed?: Yes    Stage of Change  Stage of Change: Precontemplation  Type of Treatment Offered: Inpatient, IOP, AA/NA meeting resource, Individual  Treatment Offered: Declined (Pt reported not having a problem with daily use of meth and does not want help)  Duration of Substance Use: 15 year  Frequency of Substance Use: daily (Per pt has has been smoking meth daily since age 23, via inhalation)  Age of First Substance Use: 23    Behavioral Health  Behavioral Health(WDL): Within Defined Limits  Behaviors/Mood: Agitated, Appropriate for age, Appropriate for situation, Sad (Per RN, pt received a call from her spouse prior to assessment and pt got into verbal agrument with him and hung up on him)  Affect: Appropriate to circumstances  Emotional Support Given: Reassure    Orientation  Orientation Level: Oriented X4    General Appearance  Motor Activity: Unremarkable  General Attitude: Defensive, Guarded, Uninterested, Withdrawn (Pt gave one word answers)  Appearance/Hygiene: Disheveled    Thought Process  Coherency: [place] thinking  Content: Ambivalence  Perception: Not altered  Hallucination: None  Judgment/Insight: Poor  Confusion: None  Cognition: Impulsive, Appropriate for developmental age, Poor judgement, Poor safety awareness, Poor attention/concentration    Sleep Pattern  Sleep Pattern: Sleeps all night (Pt reported sleep and appetite as unchanged)    Risk Factors  Self Harm/Suicidal Ideation Plan: Pt denies any thoughts or plan. LCSO brought pt in after she was on a bridge wanting to jump  Previous Self Harm/Suicidal Plans: Pt has had two previous OD attempts via prescription meds  Risk Factors: Age < 19 years old, Lower socioeconomic status, Major mental illness, Substance abuse, Unemployment  Description of Thoughts/Ideas Leaving Unit Now: Pt reported not wanting to be in the hospital and not wanting to go inpatient and wanting to fgo home    Violence Risk Assessment  Assessment of  Violence: None noted  Thoughts of Harm to Others: No    Ability to Assess Risk Screen  Risk Screen - Ability to Assess: Able to be screened  Ask Suicide-Screening Questions  1. In the past few weeks, have you wished you were dead?: No (Pt gave one word answers due to being irriated post phone conversation with her spouse)  2. In the past few weeks, have you felt that you or your family would be better off if you were dead?: No  3. In the past week, have you been having thoughts about killing yourself?: No  4. Have you ever tried to kill yourself?: No  5. Are you having thoughts of killing yourself right now?: No  Calculated Risk Score: No intervention is necessary  [place] Suicide Severity Rating Scale (Screener/Recent Self-Report)  1. Wish to be Dead (Past 1 Month): No  2. Non-Specific Active Suicidal Thoughts (Past 1 Month): No  3. Active Suicidal Ideation with any Methods (Not Plan) Without Intent to Act (Past 1 Month): No  4. Active Suicidal Ideation with Some Intent to Act, Without Specific Plan (Past 1 Month): No  5. Active Suicidal Ideation with Specific Plan and Intent (Past 1 Month): Yes  6. Suicidal Behavior (Lifetime): Yes  6. Suicidal Behavior (3 Months): Yes  6. Suicidal Behavior (Description): Pt made an attempt to jump off a bridge this evening post verbal altercation with spouse (ETOH and meth use)  Calculated C-SSRS Risk Score (Lifetime/Recent): High Risk  Step 1: Risk Factors  Current & Past Psychiatric Dx: Mood disorder, Alcohol/substance abuse disorders  Presenting Symptoms: Impulsivity  Change in Treatment: Non-compliant or not receiving treatment  Access to Lethal Methods : No  Step 3: Suicidal Ideation Intensity  Most Severe Suicidal Ideation Identified: Pt denies any current thoughts of harm  What Sort of Reasons Did You Have For Thinking About Wanting to Die or Killing Yourself:  (Pt denies any/all feeling or actions associated with wanting to die)  Step 5: Documentation  Risk Level: High  "suicide risk (Pt denies any/all SI though pt is clearly struggling with negative thoughts associated with depression, made an attempt to jump off a bridge)    Psychiatric Impression and Plan of Care  Assessment and Plan: After completion of assessment; pt presents with a flat affect and very little interest in completing assessment. Pt gave one word answers, reporting she doesn't need to be in the hospital and wants to go home. Pt has had 2 past attempts (May & Sept 2024, both via OD, pills) [facility] [place] ([facility] and [facility], also reviewed post assessment and pt was indicated to be high risk based on chart review and pt making an attempt to jump off a bridge today. Pt meets criteria for inpatient treatment services for safety and stabilzation.  Specific Resources Provided to Patient: Peer support was unavailable.    Outcome/Disposition  Patient's Perception of Outcome Achieved: Pt reported she \"doesn't feel safe\" in the hospital and wants to go home  Assessment, Recommendations and Risk Level Reviewed with: EVA consulted with ED Kevin De Souza and ED PRICILA Hopkins to review clinicals. All parties in agreement for inpatient treatment services.  Contact Name: Krunal Sofia  Contact Number(s): [phone]  Contact Relationship: Spouse  EPAT Assessment Completed Date: 11/05/24  EPAT Assessment Completed Time: 0550  Patient Disposition: Out of network facility (Specify)  Out of Network Reason: Patient requires dual diagnosis treatment      "

## 2024-11-05 NOTE — ED NOTES
Pt's belongings placed in locker 2.    Belonging include:  Pair of tennis shoes  Leggings  Apolinar shirt  Sweat shirt  Purse  2 cell phones       Courtney Hollins RN  11/05/24 1887

## 2024-11-05 NOTE — PROGRESS NOTES
[facility], Per [facility], pt has been accepted to [facility] under Dr. Blandon to the 1600 unit. RN2RN [phone]

## 2024-11-05 NOTE — PROGRESS NOTES
[id number] SW did M&G. Pt reported she had been drinking and using meth this evening. Pt was tearful and reporting she wanted to go home. SW advised pt she has been pink slipped by LCSO due to threatening to jump off a bridge (was found sitting on bridge by LCSO). EVA advised once labs came back and pt BAL can be confirmed to be at or below legal limit, she would be assessed. Per report, pt lost her mom in August 2024 and made an attempt overdose in Sept and went inpatient to [facility] at that time.

## 2025-06-21 ENCOUNTER — HOSPITAL ENCOUNTER (EMERGENCY)
Facility: HOSPITAL | Age: 40
Discharge: HOME | End: 2025-06-21
Attending: EMERGENCY MEDICINE
Payer: MEDICAID

## 2025-06-21 VITALS
WEIGHT: 142 LBS | BODY MASS INDEX: 23.66 KG/M2 | SYSTOLIC BLOOD PRESSURE: 118 MMHG | DIASTOLIC BLOOD PRESSURE: 87 MMHG | OXYGEN SATURATION: 100 % | TEMPERATURE: 98.4 F | RESPIRATION RATE: 20 BRPM | HEIGHT: 65 IN | HEART RATE: 99 BPM

## 2025-06-21 DIAGNOSIS — L98.9 SKIN LESION OF FACE: Primary | ICD-10-CM

## 2025-06-21 PROCEDURE — 99283 EMERGENCY DEPT VISIT LOW MDM: CPT

## 2025-06-21 PROCEDURE — 99281 EMR DPT VST MAYX REQ PHY/QHP: CPT | Performed by: EMERGENCY MEDICINE

## 2025-06-21 RX ORDER — IVERMECTIN 3 MG/1
13500 TABLET ORAL WEEKLY
Qty: 9 TABLET | Refills: 0 | Status: SHIPPED | OUTPATIENT
Start: 2025-06-21 | End: 2025-06-29

## 2025-06-21 RX ORDER — DOXYCYCLINE 100 MG/1
100 CAPSULE ORAL 2 TIMES DAILY
Qty: 20 CAPSULE | Refills: 0 | Status: SHIPPED | OUTPATIENT
Start: 2025-06-21 | End: 2025-07-01

## 2025-06-21 ASSESSMENT — PAIN - FUNCTIONAL ASSESSMENT: PAIN_FUNCTIONAL_ASSESSMENT: 0-10

## 2025-06-21 ASSESSMENT — LIFESTYLE VARIABLES
EVER FELT BAD OR GUILTY ABOUT YOUR DRINKING: NO
HAVE PEOPLE ANNOYED YOU BY CRITICIZING YOUR DRINKING: NO
EVER HAD A DRINK FIRST THING IN THE MORNING TO STEADY YOUR NERVES TO GET RID OF A HANGOVER: NO
TOTAL SCORE: 0
HAVE YOU EVER FELT YOU SHOULD CUT DOWN ON YOUR DRINKING: NO

## 2025-06-21 ASSESSMENT — PAIN SCALES - GENERAL: PAINLEVEL_OUTOF10: 5 - MODERATE PAIN

## 2025-06-21 NOTE — ED PROVIDER NOTES
HPI   Chief Complaint   Patient presents with    Skin Complaint       39-year-old female presented emergency department with a chief complaint of skin lesions on her face that have been there for 2 years.  She was there is worms crawling out of her face.  Patient does have a history of methamphetamine abuse.  She is nontoxic-appearing.  Has been on multiple rounds of antibiotic.  She is requesting incision and drainage of her face.  She has been seen by infectious disease and dermatology and primary.  The area has been biopsied.  No other complaint.              Patient History   Medical History[1]  Surgical History[2]  Family History[3]  Social History[4]    Physical Exam   ED Triage Vitals [06/21/25 1303]   Temperature Heart Rate Respirations BP   36.9 °C (98.4 °F) 99 20 118/87      Pulse Ox Temp Source Heart Rate Source Patient Position   100 % Temporal -- --      BP Location FiO2 (%)     -- --       Physical Exam  Vitals and nursing note reviewed.   Constitutional:       Appearance: Normal appearance.   HENT:      Head: Normocephalic.      Nose: Nose normal.      Mouth/Throat:      Mouth: Mucous membranes are moist.   Cardiovascular:      Rate and Rhythm: Normal rate and regular rhythm.      Pulses: Normal pulses.      Heart sounds: Normal heart sounds.   Pulmonary:      Effort: Pulmonary effort is normal.      Breath sounds: Normal breath sounds.   Abdominal:      General: Abdomen is flat.      Palpations: Abdomen is soft.   Musculoskeletal:         General: Normal range of motion.      Cervical back: Normal range of motion.   Skin:     Comments: Multiple open skin lesions on the right side of face   Neurological:      Mental Status: She is alert and oriented to person, place, and time.   Psychiatric:         Mood and Affect: Mood normal.           ED Course & MDM   Diagnoses as of 06/21/25 1339   Skin lesion of face                 No data recorded     Jorge L Coma Scale Score: 15 (06/21/25 1305 : Traci White,  RN)                           Medical Decision Making  I have seen and evaluated this patient.  The attending physician has also seen and evaluated this patient.  Vital signs, laboratory testing and diagnostic images if applicable have been reviewed.  All laboratory and imaging is interpreted by myself unless otherwise stated.  Radiology studies are also formally interpreted by radiologist.     Patient with skin lesions.  Patient appears to be picking skin.  Symptoms have been for years.  Placed on ivermectin and doxycycline outpatient referral.    Labs Reviewed - No data to display  No orders to display  Medications - No data to display  New Prescriptions  DOXYCYCLINE (VIBRAMYCIN) 100 MG CAPSULE     Take 1 capsule (100 mg) by mouth 2 times a day for 10 days. Take with at least 8 ounces (large glass) of water, do not lie down for 30 minutes after  IVERMECTIN (STROMECTOL) 3 MG TABLET     Take 4.5 tablets (13,500 mcg) by mouth 1 (one) time per week for 2 doses. Repeat dose in 7 days            Procedure  Procedures       [1] No past medical history on file.  [2] No past surgical history on file.  [3] No family history on file.  [4]   Social History  Tobacco Use    Smoking status: Every Day     Current packs/day: 1.00     Types: Cigarettes    Smokeless tobacco: Not on file   Substance Use Topics    Alcohol use: Not on file    Drug use: Yes     Types: Amphetamines        Marcell Burris PA-C  06/21/25 2191

## 2025-06-21 NOTE — ED TRIAGE NOTES
Pt states that her right side of her face is swollen. Pt states that the skin on her right leg is inflamed.

## 2025-07-20 ENCOUNTER — HOSPITAL ENCOUNTER (EMERGENCY)
Facility: HOSPITAL | Age: 40
Discharge: LEFT BEFORE DISCHARGE | End: 2025-07-20
Payer: MEDICAID

## 2025-07-20 VITALS
BODY MASS INDEX: 23.66 KG/M2 | HEIGHT: 65 IN | TEMPERATURE: 97.9 F | SYSTOLIC BLOOD PRESSURE: 121 MMHG | RESPIRATION RATE: 20 BRPM | DIASTOLIC BLOOD PRESSURE: 92 MMHG | WEIGHT: 142 LBS | OXYGEN SATURATION: 100 % | HEART RATE: 97 BPM

## 2025-07-20 DIAGNOSIS — F42.4 SKIN-PICKING DISORDER: Primary | ICD-10-CM

## 2025-07-20 PROCEDURE — 99281 EMR DPT VST MAYX REQ PHY/QHP: CPT

## 2025-07-20 ASSESSMENT — PAIN SCALES - GENERAL: PAINLEVEL_OUTOF10: 3

## 2025-07-20 ASSESSMENT — LIFESTYLE VARIABLES
EVER FELT BAD OR GUILTY ABOUT YOUR DRINKING: NO
EVER HAD A DRINK FIRST THING IN THE MORNING TO STEADY YOUR NERVES TO GET RID OF A HANGOVER: NO
TOTAL SCORE: 0
HAVE YOU EVER FELT YOU SHOULD CUT DOWN ON YOUR DRINKING: NO
HAVE PEOPLE ANNOYED YOU BY CRITICIZING YOUR DRINKING: NO

## 2025-07-20 ASSESSMENT — PAIN - FUNCTIONAL ASSESSMENT: PAIN_FUNCTIONAL_ASSESSMENT: 0-10

## 2025-07-21 NOTE — ED PROVIDER NOTES
Emergency Department Provider Note       History of Present Illness     History provided by: Patient  Limitations to History: Uncooperative  External Records Reviewed with Brief Summary: Recent ED visit for same, primary care follow-up at the [facility] in May for the same    HPI:  Latrice Sofia is a 40 y.o. female presents for skin lesions.  She tells me that she has been seen by for dermatologist and going to give her a diagnosis.  She tells me that she has seen here 2 weeks ago and she was ignored.  She tells me that she has noticed worms coming out of her skin and she has numerous lesions all over her body.  She feels like she can feel it moving and she wants to be admitted for an infectious disease consultation as well as a dermatology consultation.    Physical Exam   Triage vitals:  T 36.6 °C (97.9 °F)  HR 97  BP (!) 121/92  RR 20  O2 100 %      Physical Exam  Vitals and nursing note reviewed.   Constitutional:       Comments: Actively seen scratching at her face   HENT:      Head:      Comments: Numerous excoriations scattered     Nose:      Comments: Open wound on left nose     Mouth/Throat:      Mouth: Mucous membranes are moist.     Eyes:      Conjunctiva/sclera: Conjunctivae normal.       Cardiovascular:      Comments: Appears well-perfused  Pulmonary:      Effort: Pulmonary effort is normal. No respiratory distress.     Musculoskeletal:         General: Normal range of motion.      Cervical back: Normal range of motion and neck supple.     Skin:     Findings: Lesion present.      Comments: Numerous lesions throughout her entire upper and lower extremities, she points to an open wound on her left calf and tells me there is a warm here, there is no movement     Neurological:      General: No focal deficit present.      Mental Status: She is alert and oriented to person, place, and time.     Psychiatric:         Mood and Affect: Mood is anxious.         Behavior: Behavior is agitated.          Thought Content: Thought content is delusional.           Medical Decision Making & ED Course   Medical Decision Makin y.o. female presents for numerous lesions.  Likely psychogenic skin picking.  As I was talking the patient she asked me what the lesion was on her nose I stated it appears to be a wound at point she became very upset with me.  She stated that she was done with this hospital and she was going to go to a better hospital.  She declined further interviewing, I did not speak to her about discharge or follow-up.  She is otherwise with her , she has capacity.  She does not appear to be acutely disoriented.  ----      Differential diagnoses considered include but are not limited to: Psychogenic skin picking    Social Determinants of Health which Significantly Impact Care: Social Determinants of Health which Significantly Impact Care: Mental health disorder     EKG Independent Interpretation: EKG not obtained    Independent Result Review and Interpretation: None obtained    Chronic conditions affecting the patient's care: None    The patient was discussed with the following consultants/services: None    Care Considerations: None    ED Course:  Diagnoses as of 25 2018   Skin-picking disorder       Disposition   As a result of the work-up, the patient was discharged home.  she was informed of her diagnosis and instructed to come back with any concerns or worsening of condition.  she and was agreeable to the plan as discussed above.  she was given the opportunity to ask questions.  All of the patient's questions were answered.    Procedures   Procedures        Navjot Lance MD  Emergency Medicine                                                       Navjot Lance MD  25